# Patient Record
Sex: FEMALE | Race: WHITE | NOT HISPANIC OR LATINO | URBAN - METROPOLITAN AREA
[De-identification: names, ages, dates, MRNs, and addresses within clinical notes are randomized per-mention and may not be internally consistent; named-entity substitution may affect disease eponyms.]

---

## 2022-08-01 ENCOUNTER — OFFICE VISIT (OUTPATIENT)
Dept: OBSTETRICS AND GYNECOLOGY | Facility: CLINIC | Age: 37
End: 2022-08-01

## 2022-08-01 VITALS
BODY MASS INDEX: 25.92 KG/M2 | SYSTOLIC BLOOD PRESSURE: 124 MMHG | WEIGHT: 151.8 LBS | HEIGHT: 64 IN | DIASTOLIC BLOOD PRESSURE: 82 MMHG

## 2022-08-01 DIAGNOSIS — Z01.419 WOMEN'S ANNUAL ROUTINE GYNECOLOGICAL EXAMINATION: ICD-10-CM

## 2022-08-01 DIAGNOSIS — Z01.419 PAP TEST, AS PART OF ROUTINE GYNECOLOGICAL EXAMINATION: Primary | ICD-10-CM

## 2022-08-01 PROCEDURE — 99385 PREV VISIT NEW AGE 18-39: CPT | Performed by: NURSE PRACTITIONER

## 2022-08-01 PROCEDURE — 3008F BODY MASS INDEX DOCD: CPT | Performed by: NURSE PRACTITIONER

## 2022-08-01 PROCEDURE — 2014F MENTAL STATUS ASSESS: CPT | Performed by: NURSE PRACTITIONER

## 2022-08-01 NOTE — PROGRESS NOTES
GYN Annual Exam     CC - Here for annual exam.        HPI  Reg Abarca is a 37 y.o. female, , who presents for annual well woman exam. Patient's last menstrual period was 2022..  Periods are regular every 25-35 days, lasting 5 days. .  Dysmenorrhea:none.  Patient reports problems with: none.  There were no changes to her medical or surgical history since her last visit.. Partner Status:  Engaged. She is sexually active. She has not had new partners.. STD testing recommendations have been explained to the patient and she does not desire STD testing.    Patient denies any other issues or concerns at today's office visit.     Additional OB/GYN History   Current contraception: contraceptive methods: None  Desires to: do not start contraception  Last Pap : approximately 3 years ago-negative.  Last Completed Pap Smear     This patient has no relevant Health Maintenance data.        History of abnormal Pap smear: no  Family history of uterine, colon, breast, or ovarian cancer: no  Performs monthly Self-Breast Exam: no  Exercises Regularly:yes  Feelings of Anxiety or Depression: no  Tobacco Usage?: No   OB History        2    Para   2    Term   2            AB        Living   2       SAB        IAB        Ectopic        Molar        Multiple        Live Births   2                Health Maintenance   Topic Date Due   • COVID-19 Vaccine (1) Never done   • ANNUAL PHYSICAL  Never done   • TDAP/TD VACCINES (1 - Tdap) Never done   • HEPATITIS C SCREENING  Never done   • PAP SMEAR  Never done   • INFLUENZA VACCINE  10/01/2022   • Annual Gynecologic Pelvic and Breast Exam  2023   • Pneumococcal Vaccine 0-64  Aged Out       The additional following portions of the patient's history were reviewed and updated as appropriate: allergies, current medications, past family history, past medical history, past social history and past surgical history.    Review of Systems   Constitutional: Negative.   "  Respiratory: Negative.    Cardiovascular: Negative.    Gastrointestinal: Negative.    Genitourinary: Negative.    Neurological: Negative.    Psychiatric/Behavioral: Negative.          I have reviewed and agree with the HPI, ROS, and historical information as entered above. Magdalena Llanos, APRN    Objective   /82   Ht 162.6 cm (64\")   Wt 68.9 kg (151 lb 12.8 oz)   LMP 08/01/2022   Breastfeeding No   BMI 26.06 kg/m²     Physical Exam  Vitals and nursing note reviewed. Exam conducted with a chaperone present.   Constitutional:       Appearance: She is well-developed.   HENT:      Head: Normocephalic and atraumatic.   Neck:      Thyroid: No thyroid mass or thyromegaly.   Cardiovascular:      Heart sounds: No murmur heard.  Pulmonary:      Effort: Pulmonary effort is normal. No retractions.      Breath sounds: No wheezing, rhonchi or rales.   Chest:      Chest wall: No mass or tenderness.   Breasts:      Right: Normal. No mass, nipple discharge, skin change or tenderness.      Left: Normal. No mass, nipple discharge, skin change or tenderness.       Abdominal:      Palpations: Abdomen is soft. Abdomen is not rigid. There is no mass.      Tenderness: There is no abdominal tenderness. There is no guarding.      Hernia: No hernia is present. There is no hernia in the left inguinal area.   Genitourinary:     Labia:         Right: No rash, tenderness or lesion.         Left: No rash, tenderness or lesion.       Vagina: Normal. No vaginal discharge or lesions.      Cervix: No cervical motion tenderness, discharge, lesion or cervical bleeding.      Uterus: Normal. Not enlarged, not fixed and not tender.       Adnexa:         Right: No mass or tenderness.          Left: No mass or tenderness.        Rectum: No external hemorrhoid.   Musculoskeletal:      Cervical back: Normal range of motion. No muscular tenderness.   Neurological:      Mental Status: She is alert and oriented to person, place, and time. "   Psychiatric:         Behavior: Behavior normal.            Assessment and Plan    Problem List Items Addressed This Visit    None     Visit Diagnoses     Pap test, as part of routine gynecological examination    -  Primary    Relevant Orders    LIQUID-BASED PAP SMEAR, [facility] ([facility])    Women's annual routine gynecological examination            She is getting  soon and is not preventing pregnancy. Taking a PNV. Discussed after 6 months if she has not conceived and strongly desires she can F/U for fertility workup.    1. GYN annual well woman exam.   2. Preconceptual Counseling.  Discussed timed intercourse, regular cycles, prenatal vitamins, and when to call.  3. Reviewed monthly self breast exams.  Instructed to call with lumps, pain, or breast discharge.    4. RTC in 1 year or PRN with problems      Magdalena Llanos, APRN  08/01/2022

## 2022-08-02 ENCOUNTER — PATIENT ROUNDING (BHMG ONLY) (OUTPATIENT)
Dept: OBSTETRICS AND GYNECOLOGY | Facility: CLINIC | Age: 37
End: 2022-08-02

## 2022-08-02 NOTE — PROGRESS NOTES
August 2, 2022    Hello, may I speak with Regfartun Abarca?    My name is Jyotsna    I am  with [facility]   [facility]  [address]  [facility] [address] [phone].    Before we get started may I verify your date of birth? 1985    I am calling to officially welcome you to our practice and ask about your recent visit. Is this a good time to talk? yes    Tell me about your visit with us. What things went well?  Everything was fine       We're always looking for ways to make our patients' experiences even better. Do you have recommendations on ways we may improve?  no    Overall were you satisfied with your first visit to our practice? yes       I appreciate you taking the time to speak with me today. Is there anything else I can do for you? no      Thank you, and have a great day.

## 2022-08-03 LAB — REF LAB TEST METHOD: NORMAL

## 2022-09-22 ENCOUNTER — OFFICE VISIT (OUTPATIENT)
Dept: OBSTETRICS AND GYNECOLOGY | Facility: CLINIC | Age: 37
End: 2022-09-22

## 2022-09-22 VITALS
WEIGHT: 153.6 LBS | BODY MASS INDEX: 26.22 KG/M2 | HEIGHT: 64 IN | DIASTOLIC BLOOD PRESSURE: 78 MMHG | SYSTOLIC BLOOD PRESSURE: 118 MMHG

## 2022-09-22 DIAGNOSIS — B97.7 HPV (HUMAN PAPILLOMA VIRUS) INFECTION: ICD-10-CM

## 2022-09-22 DIAGNOSIS — R87.612 LGSIL ON PAP SMEAR OF CERVIX: Primary | ICD-10-CM

## 2022-09-22 DIAGNOSIS — Z76.89 ENCOUNTER FOR BIOPSY: ICD-10-CM

## 2022-09-22 DIAGNOSIS — Z32.01 POSITIVE PREGNANCY TEST: ICD-10-CM

## 2022-09-22 DIAGNOSIS — Z32.00 UNCONFIRMED PREGNANCY: ICD-10-CM

## 2022-09-22 LAB
B-HCG UR QL: POSITIVE
EXPIRATION DATE: ABNORMAL
INTERNAL NEGATIVE CONTROL: NEGATIVE
INTERNAL POSITIVE CONTROL: POSITIVE
Lab: ABNORMAL

## 2022-09-22 PROCEDURE — 81025 URINE PREGNANCY TEST: CPT | Performed by: OBSTETRICS & GYNECOLOGY

## 2022-09-22 PROCEDURE — 57455 BIOPSY OF CERVIX W/SCOPE: CPT | Performed by: OBSTETRICS & GYNECOLOGY

## 2022-09-22 NOTE — PROGRESS NOTES
Colposcopy Procedure Note        Procedures    Indications: Reg Abarca is a 37 y.o. female, , whose Patient's last menstrual period was 2022 (exact date)..  She presents for follow up for evaluation of an abnormal PAP smear that showed low-grade squamous intraepithelial neoplasia (LGSIL - encompassing HPV,mild dysplasia,LANE I) HPV 16+, HPV 18-. Prior cervical treatment includes: no treatment. She understands the need for the procedure and is aware of the complications, including post-colposcopic vaginal bleeding, vaginal leukorrhea or cervicitis.  She is aware she may experience discomfort.  After being presented with the risk, benefits, and alternatives the patient wished to proceed.      Urine pregnancy test done in the office today was positive x 2.      The patient has not had Gardasil.  She is not a smoker.      Procedure Details   The risks and benefits of the procedure and Verbal informed consent obtained.    She was positioned in the dorsal lithotomy position and a speculum was inserted into the vagina and excellent visualization of cervix achieved, cervix swabbed x 3 with acetic acid solution. The transformation zone was not completely visualized.  A cervical biopsy was obtained at 9 o'clock.  An endocervical curettage was not performed.  This colposcopy was unsatisfactory.     Findings:  The procedure was notable for:  Physical Exam  Vitals and nursing note reviewed. Exam conducted with a chaperone present.   Constitutional:       Appearance: Normal appearance. She is well-developed.   HENT:      Head: Normocephalic and atraumatic.   Pulmonary:      Effort: Pulmonary effort is normal.   Genitourinary:     Exam position: Lithotomy position.            Comments: Cervix: acetowhite lesion(s) noted at 9 o'clock; there is a Nabothian cyst with overlying vasculature  Skin:     General: Skin is warm and dry.   Neurological:      Mental Status: She is alert and oriented to person, place, and  time.   Psychiatric:         Mood and Affect: Mood normal.         Behavior: Behavior normal.         Specimens: 9:00  Specimens labelled and sent to Pathology.    Complications: none.    The patient tolerated the procedure very well and with mild discomfort and bleeding.    Assessment and Plan    Problem List Items Addressed This Visit    None     Visit Diagnoses     LGSIL on Pap smear of cervix    -  Primary    Relevant Orders    TISSUE EXAM, [facility] ([facility])    HPV (human papilloma virus) infection        Encounter for biopsy        Relevant Orders    POC Pregnancy, Urine (Completed)    Positive pregnancy test        Unconfirmed pregnancy        Relevant Orders    US Ob Transvaginal          1. Will base further treatment on Pathology findings.  2. Treatment options discussed with patient.  3. Post biopsy instructions given to patient.  4. Will contact pt with path and plan  5. Pt has not had Gardasil, we discussed beginning this after she has delivered.  Nonsmoker  6. UPT positive x 2 today, by LMP pt is 4w1d.  She currently takes OTC PNVs and I have instructed her to continue this.  She will RTC for new OB appt and u/s in 4 weeks.     Misty Carbajal MD  09/22/2022

## 2022-09-26 LAB — REF LAB TEST METHOD: NORMAL

## 2022-09-27 NOTE — PROGRESS NOTES
Please call patient about results.   Pt with Colpo showing LANE 3 (precancerous lesion).  Typically this would be treated by LEEP, but we don't do this in pregnancy.  The plan is to repeat pap, hpv testing and colpo in 12 weeks and follow this during pregnancy to ensure is doesn't progress.  If still present after pregnancy, will proceed with LEEP procedure.    She is seeing Dr. Rodriguez for pregnancy and I discussed it with her, so she is aware of the plan moving forward.      No smoking if she does

## 2022-10-17 ENCOUNTER — INITIAL PRENATAL (OUTPATIENT)
Dept: OBSTETRICS AND GYNECOLOGY | Facility: CLINIC | Age: 37
End: 2022-10-17

## 2022-10-17 VITALS — WEIGHT: 157.6 LBS | SYSTOLIC BLOOD PRESSURE: 122 MMHG | BODY MASS INDEX: 27.05 KG/M2 | DIASTOLIC BLOOD PRESSURE: 78 MMHG

## 2022-10-17 DIAGNOSIS — Z34.81 PRENATAL CARE, SUBSEQUENT PREGNANCY IN FIRST TRIMESTER: ICD-10-CM

## 2022-10-17 DIAGNOSIS — Z98.891 PREVIOUS CESAREAN SECTION: ICD-10-CM

## 2022-10-17 DIAGNOSIS — Z23 FLU VACCINE NEED: Primary | ICD-10-CM

## 2022-10-17 PROCEDURE — 99214 OFFICE O/P EST MOD 30 MIN: CPT | Performed by: OBSTETRICS & GYNECOLOGY

## 2022-10-17 PROCEDURE — 90471 IMMUNIZATION ADMIN: CPT | Performed by: OBSTETRICS & GYNECOLOGY

## 2022-10-17 PROCEDURE — 90686 IIV4 VACC NO PRSV 0.5 ML IM: CPT | Performed by: OBSTETRICS & GYNECOLOGY

## 2022-10-17 RX ORDER — CETIRIZINE HYDROCHLORIDE 10 MG/1
10 TABLET ORAL DAILY
COMMUNITY

## 2022-10-17 RX ORDER — PRENATAL VIT NO.126/IRON/FOLIC 28MG-0.8MG
TABLET ORAL DAILY
COMMUNITY

## 2022-10-19 LAB
ABO GROUP BLD: ABNORMAL
AMPHETAMINES UR QL SCN: NEGATIVE NG/ML
BACTERIA UR CULT: NO GROWTH
BACTERIA UR CULT: NORMAL
BARBITURATES UR QL SCN: NEGATIVE NG/ML
BASOPHILS # BLD AUTO: 0.1 X10E3/UL (ref 0–0.2)
BASOPHILS NFR BLD AUTO: 1 %
BENZODIAZ UR QL SCN: NEGATIVE NG/ML
BLD GP AB SCN SERPL QL: NEGATIVE
BZE UR QL SCN: NEGATIVE NG/ML
C TRACH RRNA SPEC QL NAA+PROBE: NEGATIVE
CANNABINOIDS UR QL SCN: NEGATIVE NG/ML
CREAT UR-MCNC: 26.3 MG/DL (ref 20–300)
EOSINOPHIL # BLD AUTO: 0 X10E3/UL (ref 0–0.4)
EOSINOPHIL NFR BLD AUTO: 0 %
ERYTHROCYTE [DISTWIDTH] IN BLOOD BY AUTOMATED COUNT: 11.9 % (ref 11.7–15.4)
HBV SURFACE AG SERPL QL IA: NEGATIVE
HCT VFR BLD AUTO: 43.2 % (ref 34–46.6)
HCV AB S/CO SERPL IA: <0.1 S/CO RATIO (ref 0–0.9)
HGB BLD-MCNC: 14.7 G/DL (ref 11.1–15.9)
HIV 1+2 AB+HIV1 P24 AG SERPL QL IA: NON REACTIVE
IMM GRANULOCYTES # BLD AUTO: 0 X10E3/UL (ref 0–0.1)
IMM GRANULOCYTES NFR BLD AUTO: 0 %
LABORATORY COMMENT REPORT: NORMAL
LYMPHOCYTES # BLD AUTO: 2.1 X10E3/UL (ref 0.7–3.1)
LYMPHOCYTES NFR BLD AUTO: 23 %
MCH RBC QN AUTO: 33.8 PG (ref 26.6–33)
MCHC RBC AUTO-ENTMCNC: 34 G/DL (ref 31.5–35.7)
MCV RBC AUTO: 99 FL (ref 79–97)
METHADONE UR QL SCN: NEGATIVE NG/ML
MONOCYTES # BLD AUTO: 0.6 X10E3/UL (ref 0.1–0.9)
MONOCYTES NFR BLD AUTO: 6 %
N GONORRHOEA RRNA SPEC QL NAA+PROBE: NEGATIVE
NEUTROPHILS # BLD AUTO: 6.5 X10E3/UL (ref 1.4–7)
NEUTROPHILS NFR BLD AUTO: 70 %
OPIATES UR QL SCN: NEGATIVE NG/ML
OXYCODONE+OXYMORPHONE UR QL SCN: NEGATIVE NG/ML
PCP UR QL: NEGATIVE NG/ML
PH UR: 6.3 [PH] (ref 4.5–8.9)
PLATELET # BLD AUTO: 224 X10E3/UL (ref 150–450)
PROPOXYPH UR QL SCN: NEGATIVE NG/ML
RBC # BLD AUTO: 4.35 X10E6/UL (ref 3.77–5.28)
RH BLD: POSITIVE
RPR SER QL: NON REACTIVE
RUBV IGG SERPL IA-ACNC: <0.9 INDEX
WBC # BLD AUTO: 9.3 X10E3/UL (ref 3.4–10.8)

## 2022-11-15 ENCOUNTER — ROUTINE PRENATAL (OUTPATIENT)
Dept: OBSTETRICS AND GYNECOLOGY | Facility: CLINIC | Age: 37
End: 2022-11-15

## 2022-11-15 VITALS — DIASTOLIC BLOOD PRESSURE: 82 MMHG | WEIGHT: 159.6 LBS | BODY MASS INDEX: 27.4 KG/M2 | SYSTOLIC BLOOD PRESSURE: 124 MMHG

## 2022-11-15 DIAGNOSIS — Z34.01 ENCOUNTER FOR SUPERVISION OF NORMAL FIRST PREGNANCY IN FIRST TRIMESTER: Primary | ICD-10-CM

## 2022-11-15 LAB
GLUCOSE UR STRIP-MCNC: NEGATIVE MG/DL
PROT UR STRIP-MCNC: NEGATIVE MG/DL

## 2022-11-15 PROCEDURE — 99213 OFFICE O/P EST LOW 20 MIN: CPT

## 2022-11-15 NOTE — PROGRESS NOTES
OB FOLLOW UP  CC- Here for care of pregnancy        Reg Abarca is a 37 y.o.  12w1d patient being seen today for her obstetrical follow up visit. Patient reports no complaints.    Her prenatal care is complicated by (and status) : None  Patient Active Problem List   Diagnosis   • Previous  section   • Prenatal care, subsequent pregnancy in first trimester       Desires genetic testing?: Yes with Gender  Flu Status: Already given in current flu season  Ultrasound Today: No    ROS -   Patient Reports : No Problems  Patient Denies: Loss of Fluid, Vaginal Spotting, Vision Changes, Headaches, Nausea  and Vomiting   Fetal Movement : Absent  All other systems reviewed and are negative.     The additional following portions of the patient's history were reviewed and updated as appropriate: allergies, current medications, past family history, past medical history, past social history, past surgical history and problem list.    I have reviewed and agree with the HPI, ROS, and historical information as entered above. Susan Nayak, APRN    /82   Wt 72.4 kg (159 lb 9.6 oz)   LMP 2022 (Exact Date)   BMI 27.40 kg/m²         EXAM:     Prenatal Vitals  BP: 124/82  Weight: 72.4 kg (159 lb 9.6 oz)   Fetal Heart Rate: 112          Urine Glucose Read-only: Negative  Urine Protein Read-only: Negative       Assessment and Plan    Problem List Items Addressed This Visit    None  Visit Diagnoses     Encounter for supervision of normal first pregnancy in first trimester    -  Primary    Relevant Orders    [id number] PLUS Core+SCA+ESS - Blood,          1. Pregnancy at 12w1d  2. Labs reviewed from New OB Visit.  3. Counseled on genetic testing, carrier status and option for NT screen  4. Activity and Exercise discussed.  5. Lab(s) Ordered- Mat21  6. Patient is on Prenatal vitamins  Return in about 4 weeks (around 2022) for AUTUMN w/ HELENA.    Susan Nayak, JOVANI  11/15/2022

## 2022-12-14 ENCOUNTER — ROUTINE PRENATAL (OUTPATIENT)
Dept: OBSTETRICS AND GYNECOLOGY | Facility: CLINIC | Age: 37
End: 2022-12-14
Payer: COMMERCIAL

## 2022-12-14 VITALS — BODY MASS INDEX: 27.77 KG/M2 | SYSTOLIC BLOOD PRESSURE: 118 MMHG | DIASTOLIC BLOOD PRESSURE: 86 MMHG | WEIGHT: 161.8 LBS

## 2022-12-14 DIAGNOSIS — Z98.891 PREVIOUS CESAREAN SECTION: ICD-10-CM

## 2022-12-14 DIAGNOSIS — O09.529 ANTEPARTUM MULTIGRAVIDA OF ADVANCED MATERNAL AGE: ICD-10-CM

## 2022-12-14 DIAGNOSIS — Z34.82 MULTIGRAVIDA IN SECOND TRIMESTER: Primary | ICD-10-CM

## 2022-12-14 LAB
GLUCOSE UR STRIP-MCNC: NEGATIVE MG/DL
PROT UR STRIP-MCNC: NEGATIVE MG/DL

## 2022-12-14 PROCEDURE — 99213 OFFICE O/P EST LOW 20 MIN: CPT | Performed by: OBSTETRICS & GYNECOLOGY

## 2022-12-14 NOTE — PROGRESS NOTES
OB FOLLOW UP  CC- Here for care of pregnancy        Reg Abarca is a 37 y.o.  16w2d patient being seen today for her obstetrical follow up visit. Patient reports nausea in the evenings.     Her prenatal care is complicated by (and status) : AMA  Patient Active Problem List   Diagnosis   • Previous  section   • Prenatal care, subsequent pregnancy in first trimester   • Multigravida in second trimester   • Antepartum multigravida of advanced maternal age       Flu Status: Already given in current flu season  Ultrasound Today: No    AFP: desires    ROS -   Patient Reports : Patient reports nausea in the evenings.  Patient Denies: Loss of Fluid, Vaginal Spotting, Vision Changes, Headaches, Vomiting , Contractions and Epigastric pain  Fetal Movement : absent-too early  All other systems reviewed and are negative.       The additional following portions of the patient's history were reviewed and updated as appropriate: allergies, current medications, past family history, past medical history, past social history, past surgical history and problem list.    I have reviewed and agree with the HPI, ROS, and historical information as entered above. Lisandra Rodriguez MD        EXAM:     Prenatal Vitals  BP: 118/86  Weight: 73.4 kg (161 lb 12.8 oz)   Fetal Heart Rate: pos   Pelvic Exam:        Urine Glucose Read-only: Negative  Urine Protein Read-only: Negative           Assessment and Plan    Problem List Items Addressed This Visit     Previous  section    Overview     C/s x 2         Multigravida in second trimester - Primary    Relevant Orders    Alpha Fetoprotein, Maternal (Completed)    POC Urinalysis Dipstick (Completed)    US Ob 14 + Weeks Single or First Gestation    Antepartum multigravida of advanced maternal age       1. Pregnancy at 16w2d  2. Fetal status reassuring.   3. Counseled on MSAFP alone in relation to OTD and placental issues.    4. Anatomy scan next visit.   5. Activity and  Exercise discussed.  6. U/S ordered at follow up  7. Patient is on Prenatal vitamins  Return in about 4 weeks (around 1/11/2023) for WITH SONO.    Lisandra Rodriguez MD  12/14/2022

## 2022-12-16 LAB
AFP INTERP SERPL-IMP: NORMAL
AFP INTERP SERPL-IMP: NORMAL
AFP MOM SERPL: 1
AFP SERPL-MCNC: 34.9 NG/ML
AGE AT DELIVERY: 37.8 YR
GA METHOD: NORMAL
GA: 16.3 WEEKS
IDDM PATIENT QL: NO
LABORATORY COMMENT REPORT: NORMAL
MULTIPLE PREGNANCY: NO
NEURAL TUBE DEFECT RISK FETUS: NORMAL %
RESULT: NORMAL

## 2023-01-05 PROBLEM — O09.529 ANTEPARTUM MULTIGRAVIDA OF ADVANCED MATERNAL AGE: Status: ACTIVE | Noted: 2023-01-05

## 2023-01-17 ENCOUNTER — ROUTINE PRENATAL (OUTPATIENT)
Dept: OBSTETRICS AND GYNECOLOGY | Facility: CLINIC | Age: 38
End: 2023-01-17
Payer: COMMERCIAL

## 2023-01-17 VITALS — DIASTOLIC BLOOD PRESSURE: 62 MMHG | SYSTOLIC BLOOD PRESSURE: 116 MMHG | BODY MASS INDEX: 27.98 KG/M2 | WEIGHT: 163 LBS

## 2023-01-17 DIAGNOSIS — Z34.82 MULTIGRAVIDA IN SECOND TRIMESTER: ICD-10-CM

## 2023-01-17 DIAGNOSIS — Z98.891 PREVIOUS CESAREAN SECTION: ICD-10-CM

## 2023-01-17 DIAGNOSIS — O09.529 ANTEPARTUM MULTIGRAVIDA OF ADVANCED MATERNAL AGE: ICD-10-CM

## 2023-01-17 DIAGNOSIS — Z34.82 PRENATAL CARE, SUBSEQUENT PREGNANCY, SECOND TRIMESTER: Primary | ICD-10-CM

## 2023-01-17 LAB
GLUCOSE UR STRIP-MCNC: NEGATIVE MG/DL
PROT UR STRIP-MCNC: NEGATIVE MG/DL

## 2023-01-17 PROCEDURE — 99213 OFFICE O/P EST LOW 20 MIN: CPT | Performed by: OBSTETRICS & GYNECOLOGY

## 2023-01-17 RX ORDER — LANOLIN ALCOHOL/MO/W.PET/CERES
25 CREAM (GRAM) TOPICAL DAILY
COMMUNITY

## 2023-02-14 ENCOUNTER — ROUTINE PRENATAL (OUTPATIENT)
Dept: OBSTETRICS AND GYNECOLOGY | Facility: CLINIC | Age: 38
End: 2023-02-14
Payer: COMMERCIAL

## 2023-02-14 VITALS — WEIGHT: 166.4 LBS | SYSTOLIC BLOOD PRESSURE: 112 MMHG | DIASTOLIC BLOOD PRESSURE: 72 MMHG | BODY MASS INDEX: 28.56 KG/M2

## 2023-02-14 DIAGNOSIS — O09.529 ANTEPARTUM MULTIGRAVIDA OF ADVANCED MATERNAL AGE: ICD-10-CM

## 2023-02-14 DIAGNOSIS — O43.109: ICD-10-CM

## 2023-02-14 DIAGNOSIS — R87.613 HSIL ON PAP SMEAR OF CERVIX: ICD-10-CM

## 2023-02-14 DIAGNOSIS — Z34.82 MULTIGRAVIDA IN SECOND TRIMESTER: Primary | ICD-10-CM

## 2023-02-14 DIAGNOSIS — Z98.891 PREVIOUS CESAREAN SECTION: ICD-10-CM

## 2023-02-14 LAB
GLUCOSE UR STRIP-MCNC: NEGATIVE MG/DL
PROT UR STRIP-MCNC: NEGATIVE MG/DL

## 2023-02-14 PROCEDURE — 99213 OFFICE O/P EST LOW 20 MIN: CPT | Performed by: OBSTETRICS & GYNECOLOGY

## 2023-02-14 NOTE — PROGRESS NOTES
OB FOLLOW UP  CC- Here for care of pregnancy        Reg Martinez is a 37 y.o.  25w1d patient being seen today for her obstetrical follow up visit. Patient reports no complaints.    Her prenatal care is complicated by (and status) :   Patient Active Problem List   Diagnosis   • Previous  section   • Prenatal care, subsequent pregnancy in first trimester   • Multigravida in second trimester   • Antepartum multigravida of advanced maternal age   • HSIL on Pap smear of cervix   • Abnormality of placenta during pregnancy       Flu Status: Already given in current flu season  Ultrasound Today: No    ROS -   Patient Reports : No Problems  Patient Denies: Loss of Fluid, Vaginal Spotting, Vision Changes, Headaches, Nausea , Vomiting , Contractions and Epigastric pain  Fetal Movement : normal  All other systems reviewed and are negative.       The additional following portions of the patient's history were reviewed and updated as appropriate: allergies, current medications, past family history, past medical history, past social history, past surgical history and problem list.    I have reviewed and agree with the HPI, ROS, and historical information as entered above. Lisandra Rodriguez MD    /72   Wt 75.5 kg (166 lb 6.4 oz)   LMP 2022 (Exact Date)   BMI 28.56 kg/m²       EXAM:     Prenatal Vitals  BP: 112/72  Weight: 75.5 kg (166 lb 6.4 oz)   Fetal Heart Rate: 155      Fundal Height (cm): 25 cm        Urine Glucose Read-only: Negative  Urine Protein Read-only: Negative       Assessment and Plan    Problem List Items Addressed This Visit     Previous  section    Overview     C/s x 2         Multigravida in second trimester - Primary    Relevant Orders    POC Urinalysis Dipstick (Completed)    Antepartum multigravida of advanced maternal age    HSIL on Pap smear of cervix    Overview     Was first abnl pap, plan was to repeat in 12wk.  Done  pap with HPV         Abnormality of  placenta during pregnancy       1. Pregnancy at 25w1d  2. Fetal status reassuring.  3. No US indicated today.  4. 1 hour gtt, CBC, Antibody screen and TDAP next visit. Instructions given  5. Lab(s) Ordered  6. Discussed/encouraged TDAP vaccination after 28 weeks  7. Activity and Exercise discussed.  Return in about 3 weeks (around 3/7/2023) for WITH SONO, 28wk labs.    Lisandra Rodriguez MD  02/14/2023

## 2023-02-15 LAB — REF LAB TEST METHOD: NORMAL

## 2023-03-02 DIAGNOSIS — O43.109: Primary | ICD-10-CM

## 2023-03-08 ENCOUNTER — ROUTINE PRENATAL (OUTPATIENT)
Dept: OBSTETRICS AND GYNECOLOGY | Facility: CLINIC | Age: 38
End: 2023-03-08
Payer: COMMERCIAL

## 2023-03-08 VITALS — BODY MASS INDEX: 29.18 KG/M2 | DIASTOLIC BLOOD PRESSURE: 80 MMHG | SYSTOLIC BLOOD PRESSURE: 124 MMHG | WEIGHT: 170 LBS

## 2023-03-08 DIAGNOSIS — Z98.891 PREVIOUS CESAREAN SECTION: ICD-10-CM

## 2023-03-08 DIAGNOSIS — O43.109: ICD-10-CM

## 2023-03-08 DIAGNOSIS — R87.613 HSIL ON PAP SMEAR OF CERVIX: ICD-10-CM

## 2023-03-08 DIAGNOSIS — Z34.93 PRENATAL CARE IN THIRD TRIMESTER: Primary | ICD-10-CM

## 2023-03-08 DIAGNOSIS — O09.529 ANTEPARTUM MULTIGRAVIDA OF ADVANCED MATERNAL AGE: ICD-10-CM

## 2023-03-08 LAB
GLUCOSE UR STRIP-MCNC: NEGATIVE MG/DL
PROT UR STRIP-MCNC: NEGATIVE MG/DL

## 2023-03-08 PROCEDURE — 0502F SUBSEQUENT PRENATAL CARE: CPT | Performed by: OBSTETRICS & GYNECOLOGY

## 2023-03-08 PROCEDURE — 90715 TDAP VACCINE 7 YRS/> IM: CPT | Performed by: OBSTETRICS & GYNECOLOGY

## 2023-03-08 PROCEDURE — 90471 IMMUNIZATION ADMIN: CPT | Performed by: OBSTETRICS & GYNECOLOGY

## 2023-03-08 NOTE — PROGRESS NOTES
OB FOLLOW UP  CC- Here for care of pregnancy        Reg Martinez is a 37 y.o.  28w2d patient being seen today for her obstetrical follow up. Patient reports headache. That is relieved by Tylenol or rest. .     Patient undergoing Glucola testing today. She is due for her testing at 1150.       MBT: O+  Rhogam: N/A  28 week packet: reviewed with patient , counseled on fetal movement , pediatrician list reviewed, breast pump discussed and childbirth classes reviewed  TDAP: given today    Ultrasound Today: Yes    Her prenatal care is complicated by (and status) :    Patient Active Problem List   Diagnosis   • Previous  section   • Prenatal care, subsequent pregnancy in first trimester   • Multigravida in second trimester   • Antepartum multigravida of advanced maternal age   • HSIL on Pap smear of cervix   • Abnormality of placenta during pregnancy         ROS -   Patient Reports : Headaches  Patient Denies: Loss of Fluid, Vaginal Spotting, Vision Changes and Contractions  Fetal Movement : normal    The additional following portions of the patient's history were reviewed and updated as appropriate: allergies, current medications, past family history, past medical history, past social history, past surgical history and problem list.    I have reviewed and agree with the HPI, ROS, and historical information as entered above. Lisandra Rodriguez MD    /80   Wt 77.1 kg (170 lb)   LMP 2022 (Exact Date)   BMI 29.18 kg/m²         EXAM:     Prenatal Vitals  BP: 124/80  Weight: 77.1 kg (170 lb)   Fetal Heart Rate: 145bpm               Urine Glucose Read-only: Negative  Urine Protein Read-only: Negative       Assessment and Plan    Problem List Items Addressed This Visit     Previous  section    Overview     C/s x 2         Antepartum multigravida of advanced maternal age    HSIL on Pap smear of cervix    Overview     Was first abnl pap, plan was to repeat in 12wk.  Done  normal  pap with HPV neg         Abnormality of placenta during pregnancy    Relevant Orders    US Ob Follow Up Transabdominal Approach   Other Visit Diagnoses     Prenatal care in third trimester    -  Primary    Relevant Orders    Gestational Screen 1 Hr ([facility]) (Completed)    CBC (No Diff) (Completed)    Antibody Screen (Completed)    POC Urinalysis Dipstick (Completed)          1. Pregnancy at 28w2d  2. 1 hr Glucola, CBC, and antibody screen today  and TDAP given today  3. Fetal movement/PTL or Labor precautions  4. Lab(s) Ordered  5. Activity and Exercise discussed.  Return in about 4 weeks (around 4/5/2023) for Next scheduled follow up.    Lsiandra Rodriguez MD  03/08/2023

## 2023-03-09 LAB
BLD GP AB SCN SERPL QL: NEGATIVE
ERYTHROCYTE [DISTWIDTH] IN BLOOD BY AUTOMATED COUNT: 11.8 % (ref 12.3–15.4)
GLUCOSE 1H P 50 G GLC PO SERPL-MCNC: 143 MG/DL (ref 65–139)
HCT VFR BLD AUTO: 38.4 % (ref 34–46.6)
HGB BLD-MCNC: 13.4 G/DL (ref 12–15.9)
MCH RBC QN AUTO: 32.8 PG (ref 26.6–33)
MCHC RBC AUTO-ENTMCNC: 34.9 G/DL (ref 31.5–35.7)
MCV RBC AUTO: 93.9 FL (ref 79–97)
PLATELET # BLD AUTO: 181 10*3/MM3 (ref 140–450)
RBC # BLD AUTO: 4.09 10*6/MM3 (ref 3.77–5.28)
WBC # BLD AUTO: 6.47 10*3/MM3 (ref 3.4–10.8)

## 2023-03-16 ENCOUNTER — LAB (OUTPATIENT)
Dept: OBSTETRICS AND GYNECOLOGY | Facility: CLINIC | Age: 38
End: 2023-03-16
Payer: COMMERCIAL

## 2023-03-16 DIAGNOSIS — R73.09 ELEVATED GLUCOSE TOLERANCE TEST: Primary | ICD-10-CM

## 2023-03-17 LAB
GLUCOSE 1H P 100 G GLC PO SERPL-MCNC: 157 MG/DL (ref 65–179)
GLUCOSE 2H P 100 G GLC PO SERPL-MCNC: 147 MG/DL (ref 65–154)
GLUCOSE 3H P 100 G GLC PO SERPL-MCNC: 123 MG/DL (ref 65–139)
GLUCOSE P FAST SERPL-MCNC: 74 MG/DL (ref 65–94)

## 2023-04-03 ENCOUNTER — ROUTINE PRENATAL (OUTPATIENT)
Dept: OBSTETRICS AND GYNECOLOGY | Facility: CLINIC | Age: 38
End: 2023-04-03
Payer: COMMERCIAL

## 2023-04-03 VITALS — DIASTOLIC BLOOD PRESSURE: 64 MMHG | WEIGHT: 171.4 LBS | SYSTOLIC BLOOD PRESSURE: 118 MMHG | BODY MASS INDEX: 29.42 KG/M2

## 2023-04-03 DIAGNOSIS — R11.0 PREGNANCY RELATED NAUSEA, ANTEPARTUM: ICD-10-CM

## 2023-04-03 DIAGNOSIS — O43.109: ICD-10-CM

## 2023-04-03 DIAGNOSIS — Z34.83 PRENATAL CARE, SUBSEQUENT PREGNANCY, THIRD TRIMESTER: Primary | ICD-10-CM

## 2023-04-03 DIAGNOSIS — O35.EXX0 FETAL RENAL ANOMALY, SINGLE GESTATION: ICD-10-CM

## 2023-04-03 DIAGNOSIS — O26.899 PREGNANCY RELATED NAUSEA, ANTEPARTUM: ICD-10-CM

## 2023-04-03 DIAGNOSIS — R12 HEARTBURN: ICD-10-CM

## 2023-04-03 LAB
EXPIRATION DATE: 0
GLUCOSE UR STRIP-MCNC: NEGATIVE MG/DL
Lab: 0
PROT UR STRIP-MCNC: ABNORMAL MG/DL

## 2023-04-03 PROCEDURE — 0502F SUBSEQUENT PRENATAL CARE: CPT

## 2023-04-03 NOTE — ASSESSMENT & PLAN NOTE
3/8/23 Small hypoechoic area noted measuring 20 x 7 x 7 mm.  Renals appear slightly enlarged/hyperechoic; CL 4.6cm    4/3/23 Resolved

## 2023-04-03 NOTE — PROGRESS NOTES
OB FOLLOW UP  CC- Here for care of pregnancy        Reg Martinez is a 37 y.o.  32w0d patient being seen today for her obstetrical follow up visit. Patient reports heartburn, nausea, blurred vision, x1    Her prenatal care is complicated by (and status) :    Patient Active Problem List   Diagnosis   • Previous  section   • Prenatal care, subsequent pregnancy in first trimester   • Multigravida in second trimester   • Antepartum multigravida of advanced maternal age   • HSIL on Pap smear of cervix   • Abnormality of placenta during pregnancy   • Fetal renal anomaly, single gestation     ROS -   Patient Reports : see above  Patient Denies: Loss of Fluid, Vaginal Spotting, Headaches, Vomiting , Contractions and Epigastric pain  Fetal Movement : normal  All other systems reviewed and are negative.       The additional following portions of the patient's history were reviewed and updated as appropriate: allergies and current medications.    I have reviewed and agree with the HPI, ROS, and historical information as entered above. Elizabeth Fitzgerald, APRN    /64   Wt 77.7 kg (171 lb 6.4 oz)   LMP 2022 (Exact Date)   BMI 29.42 kg/m²       EXAM:     Prenatal Vitals  BP: 118/64  Weight: 77.7 kg (171 lb 6.4 oz)   Fetal Heart Rate: 148           Urine Glucose Read-only: Negative  Urine Protein Read-only: (!) Trace       Assessment and Plan    Problem List Items Addressed This Visit        Gravid and     Abnormality of placenta during pregnancy    Current Assessment & Plan     3/8/23 Small hypoechoic area noted measuring 20 x 7 x 7 mm.  Renals appear slightly enlarged/hyperechoic; CL 4.6cm    4/3/23 Resolved          Fetal renal anomaly, single gestation    Current Assessment & Plan     3/8/23 Small hypoechoic area noted measuring 20 x 7 x 7 mm.  Renals appear slightly enlarged/hyperechoic; CL 4.6cm    4/3/23 Resolved         Other Visit Diagnoses     Prenatal care, subsequent pregnancy,  third trimester    -  Primary    Relevant Orders    POC Glucose, Urine, Qualitative, Dipstick (Completed)    POC Protein, Urine, Qualitative, Dipstick (Completed)    Heartburn        Pregnancy related nausea, antepartum              1. Pregnancy at 32w0d  2. Fetal status reassuring.  3. 28 week labs reviewed.    4. Taking unisom for nausea and tums for heartburn.  5. Activity and Exercise discussed.  6. Fetal movement/PTL or Labor precautions  7. Patient is on Prenatal vitamins  8. Reviewed Pre-eclampsia signs/symptoms  9. Repeat ultrasound today for follow up placenta and fetal kidneys-resolved  10. Follow up in 2 weeks with Dr. Rodriguez.    Elizabeth Fitzgerald, APRN  04/03/2023

## 2023-04-18 ENCOUNTER — ROUTINE PRENATAL (OUTPATIENT)
Dept: OBSTETRICS AND GYNECOLOGY | Facility: CLINIC | Age: 38
End: 2023-04-18
Payer: COMMERCIAL

## 2023-04-18 VITALS — WEIGHT: 172.8 LBS | SYSTOLIC BLOOD PRESSURE: 120 MMHG | DIASTOLIC BLOOD PRESSURE: 76 MMHG | BODY MASS INDEX: 29.66 KG/M2

## 2023-04-18 DIAGNOSIS — O43.109: ICD-10-CM

## 2023-04-18 DIAGNOSIS — Z34.83 MULTIGRAVIDA IN THIRD TRIMESTER: Primary | ICD-10-CM

## 2023-04-18 DIAGNOSIS — O35.EXX0 FETAL RENAL ANOMALY, SINGLE GESTATION: ICD-10-CM

## 2023-04-18 DIAGNOSIS — Z98.891 PREVIOUS CESAREAN SECTION: ICD-10-CM

## 2023-04-18 LAB
GLUCOSE UR STRIP-MCNC: NEGATIVE MG/DL
PROT UR STRIP-MCNC: NEGATIVE MG/DL

## 2023-04-18 NOTE — PROGRESS NOTES
OB FOLLOW UP  CC- Here for care of pregnancy        Reg Martinez is a 37 y.o.  34w1d patient being seen today for her obstetrical follow up visit. Patient reports having heartburn that is resolved with TUMS and BH contractions.    Her prenatal care is complicated by (and status) : AMA  Patient Active Problem List   Diagnosis   • Previous  section   • Prenatal care, subsequent pregnancy in first trimester   • Multigravida in second trimester   • Antepartum multigravida of advanced maternal age   • HSIL on Pap smear of cervix   • Abnormality of placenta during pregnancy   • Fetal renal anomaly, single gestation   • Multigravida in third trimester          Ultrasound Today: No  Non Stress Test: No.      ROS -   Patient Reports : Patient reports having heartburn that is resolved with TUMS and BH contractions.  Patient Denies: Loss of Fluid, Vaginal Spotting, Vision Changes, Headaches, Nausea , Vomiting , Contractions and Epigastric pain  Fetal Movement : normal  All other systems reviewed and are negative.       The additional following portions of the patient's history were reviewed and updated as appropriate: allergies, current medications, past family history, past medical history, past social history, past surgical history and problem list.    I have reviewed and agree with the HPI, ROS, and historical information as entered above. Lisandra Rodriguez MD    /76   Wt 78.4 kg (172 lb 12.8 oz)   LMP 2022 (Exact Date)   BMI 29.66 kg/m²       EXAM:     Prenatal Vitals  BP: 120/76  Weight: 78.4 kg (172 lb 12.8 oz)   Fetal Heart Rate: pos               Urine Glucose Read-only: Negative  Urine Protein Read-only: Negative       Assessment and Plan    Problem List Items Addressed This Visit     Previous  section    Overview     C/s x 2  Plan vasectomy         Abnormality of placenta during pregnancy    Overview     resolved         Fetal renal anomaly, single gestation    Overview      Now normal         Multigravida in third trimester - Primary    Relevant Orders    POC Urinalysis Dipstick (Completed)       1. Pregnancy at 34w1d  2. Fetal status reassuring.   3. Activity and Exercise discussed.  4. Fetal movement/PTL or Labor precautions  5. GBS next visit  Return in about 2 weeks (around 5/2/2023) for Next scheduled follow up.    Lisandra Rodriguez MD  04/18/2023

## 2023-05-01 ENCOUNTER — ROUTINE PRENATAL (OUTPATIENT)
Dept: OBSTETRICS AND GYNECOLOGY | Facility: CLINIC | Age: 38
End: 2023-05-01
Payer: COMMERCIAL

## 2023-05-01 ENCOUNTER — LAB (OUTPATIENT)
Dept: LAB | Facility: HOSPITAL | Age: 38
End: 2023-05-01
Payer: COMMERCIAL

## 2023-05-01 VITALS — SYSTOLIC BLOOD PRESSURE: 120 MMHG | WEIGHT: 174 LBS | BODY MASS INDEX: 29.87 KG/M2 | DIASTOLIC BLOOD PRESSURE: 72 MMHG

## 2023-05-01 DIAGNOSIS — Z34.93 ENCOUNTER FOR SUPERVISION OF NORMAL PREGNANCY IN THIRD TRIMESTER, UNSPECIFIED GRAVIDITY: Primary | ICD-10-CM

## 2023-05-01 DIAGNOSIS — Z34.93 THIRD TRIMESTER PREGNANCY: Primary | ICD-10-CM

## 2023-05-01 DIAGNOSIS — Z98.891 PREVIOUS CESAREAN SECTION: ICD-10-CM

## 2023-05-01 DIAGNOSIS — O09.529 ANTEPARTUM MULTIGRAVIDA OF ADVANCED MATERNAL AGE: ICD-10-CM

## 2023-05-01 DIAGNOSIS — O35.EXX0 FETAL RENAL ANOMALY, SINGLE GESTATION: ICD-10-CM

## 2023-05-01 LAB
GLUCOSE UR STRIP-MCNC: NEGATIVE MG/DL
PROT UR STRIP-MCNC: NEGATIVE MG/DL

## 2023-05-01 PROCEDURE — 87081 CULTURE SCREEN ONLY: CPT

## 2023-05-01 NOTE — PROGRESS NOTES
OB FOLLOW UP  CC- Here for care of pregnancy        Reg Martinez is a 37 y.o.  36w0d patient being seen today for her obstetrical follow up visit. Patient reports no complaints. GBS TDY/NKDA. Pt feels well, good fm, occ ctx., no c/o    Her prenatal care is complicated by (and status) : AMA  Patient Active Problem List   Diagnosis   • Previous  section   • Prenatal care, subsequent pregnancy in first trimester   • Antepartum multigravida of advanced maternal age   • HSIL on Pap smear of cervix   • Abnormality of placenta during pregnancy   • Fetal renal anomaly, single gestation   • Multigravida in third trimester   • Pregnancy       GBS Status: Done Today. She is not allergic to PCN.    No Known Allergies       Flu Status: Already given in current flu season  Her Delivery Plan is: vaginal    US today: no  Non Stress Test: No.      ROS -   Patient Reports : No Problems  Patient Denies: Loss of Fluid, Vaginal Spotting, Vision Changes, Headaches, Nausea , Vomiting  and Epigastric pain  Fetal Movement : normal  All other systems reviewed and are negative.       The additional following portions of the patient's history were reviewed and updated as appropriate: current medications, past family history, past medical history, past social history, past surgical history and problem list.    I have reviewed and agree with the HPI, ROS, and historical information as entered above. Lisandra Rodriguez MD      EXAM:     Prenatal Vitals  BP: 120/72  Weight: 78.9 kg (174 lb)   Fetal Heart Rate: pos   Fundal Height (cm): 36 cm          Urine Glucose Read-only: Negative  Urine Protein Read-only: Negative       Assessment and Plan    Problem List Items Addressed This Visit     Previous  section    Overview     C/s x 2  Plan vasectomy         Antepartum multigravida of advanced maternal age    Fetal renal anomaly, single gestation    Overview     Now normal        Other Visit Diagnoses     Third  trimester pregnancy    -  Primary    Relevant Orders    POC Urinalysis Dipstick (Completed)          1. Pregnancy at 36w0d  2. Fetal status reassuring.   3. Reviewed Pre-eclampsia signs/symptoms  4. Delivery options reviewed with patient  5. Signs of labor reviewed  6. Kick counts reviewed  7. Activity and Exercise discussed.  Return in about 1 week (around 5/8/2023) for Next scheduled follow up.    Lisandra Rodriguez MD  05/01/2023

## 2023-05-03 LAB — BACTERIA SPEC AEROBE CULT: NORMAL

## 2023-05-09 ENCOUNTER — ROUTINE PRENATAL (OUTPATIENT)
Dept: OBSTETRICS AND GYNECOLOGY | Facility: CLINIC | Age: 38
End: 2023-05-09
Payer: COMMERCIAL

## 2023-05-09 VITALS — SYSTOLIC BLOOD PRESSURE: 126 MMHG | DIASTOLIC BLOOD PRESSURE: 82 MMHG | BODY MASS INDEX: 30.21 KG/M2 | WEIGHT: 176 LBS

## 2023-05-09 DIAGNOSIS — Z34.83 MULTIGRAVIDA IN THIRD TRIMESTER: Primary | ICD-10-CM

## 2023-05-09 DIAGNOSIS — Z98.891 PREVIOUS CESAREAN SECTION: ICD-10-CM

## 2023-05-09 DIAGNOSIS — O09.529 ANTEPARTUM MULTIGRAVIDA OF ADVANCED MATERNAL AGE: ICD-10-CM

## 2023-05-09 LAB
GLUCOSE UR STRIP-MCNC: NEGATIVE MG/DL
PROT UR STRIP-MCNC: NEGATIVE MG/DL

## 2023-05-09 NOTE — PROGRESS NOTES
OB FOLLOW UP  CC- Here for care of pregnancy        Reg Martinez is a 37 y.o.  37w1d patient being seen today for her obstetrical follow up visit. Patient reports having occasional contractions. .     Her prenatal care is complicated by (and status) :   Patient Active Problem List   Diagnosis   • Previous  section   • Prenatal care, subsequent pregnancy in first trimester   • Antepartum multigravida of advanced maternal age   • HSIL on Pap smear of cervix   • Abnormality of placenta during pregnancy   • Fetal renal anomaly, single gestation   • Multigravida in third trimester   • Pregnancy       GBS Status: Negative  Group B Strep Culture   Date Value Ref Range Status   2023 No Group B Streptococcus isolated  Final         No Known Allergies          Her Delivery Plan is: Repeat . Scheduled    US today: no  Non Stress Test: No.      ROS -   Patient Reports : Patient reports having occasional contractions.  Patient Denies: Loss of Fluid, Vaginal Spotting, Vision Changes, Headaches, Nausea , Vomiting  and Epigastric pain  Fetal Movement : normal  All other systems reviewed and are negative.       The additional following portions of the patient's history were reviewed and updated as appropriate: allergies, current medications, past family history, past medical history, past social history, past surgical history and problem list.    I have reviewed and agree with the HPI, ROS, and historical information as entered above. Lisandra Rodriguez MD      EXAM:     Prenatal Vitals  BP: 126/82  Weight: 79.8 kg (176 lb)   Fetal Heart Rate: pos   Fundal Height (cm): 37 cm      NST >20min, reactive  Indication AMA    Urine Glucose Read-only: Negative  Urine Protein Read-only: Negative       Assessment and Plan    Problem List Items Addressed This Visit     Previous  section    Overview     C/s x 2  Plan vasectomy         Antepartum multigravida of advanced maternal age     Multigravida in third trimester - Primary    Relevant Orders    POC Urinalysis Dipstick (Completed)       1. Pregnancy at 37w1d  2. Fetal status reassuring.   3. Reviewed Pre-eclampsia signs/symptoms  4. Reviewed upcoming c/s and PAT instructions with patient. Arrival time and NPO status reviewed.   5. Delivery options reviewed with patient  6. Signs of labor reviewed  7. Kick counts reviewed  8. Activity and Exercise discussed.  Return in about 1 week (around 5/16/2023) for Next scheduled follow up.    Lisandra Rodriguez MD  05/09/2023   Josy Rivers

## 2023-05-14 ENCOUNTER — HOSPITAL ENCOUNTER (OUTPATIENT)
Facility: HOSPITAL | Age: 38
Discharge: HOME | End: 2023-05-14
Attending: OBSTETRICS & GYNECOLOGY | Admitting: OBSTETRICS & GYNECOLOGY
Payer: COMMERCIAL

## 2023-05-14 VITALS
HEART RATE: 96 BPM | SYSTOLIC BLOOD PRESSURE: 132 MMHG | RESPIRATION RATE: 18 BRPM | DIASTOLIC BLOOD PRESSURE: 85 MMHG | TEMPERATURE: 98.5 F

## 2023-05-14 PROBLEM — Z34.82 MULTIGRAVIDA IN SECOND TRIMESTER: Status: RESOLVED | Noted: 2022-12-14 | Resolved: 2023-05-14

## 2023-05-14 PROBLEM — Z34.90 PREGNANCY: Status: ACTIVE | Noted: 2023-05-14

## 2023-05-14 LAB — POC AMNISURE: NEGATIVE

## 2023-05-14 PROCEDURE — G0378 HOSPITAL OBSERVATION PER HR: HCPCS

## 2023-05-14 PROCEDURE — 84112 EVAL AMNIOTIC FLUID PROTEIN: CPT | Performed by: OBSTETRICS & GYNECOLOGY

## 2023-05-14 PROCEDURE — G0463 HOSPITAL OUTPT CLINIC VISIT: HCPCS

## 2023-05-14 PROCEDURE — 59025 FETAL NON-STRESS TEST: CPT

## 2023-05-14 NOTE — H&P
[facility]  Obstetric History and Physical    Referring Provider: Lisandra Rodriguez*      Chief Complaint   Patient presents with   • Rupture of Membranes       Subjective     Patient is a 37 y.o. female  currently at 37w6d, who presents with complaint of possible rupture membranes.  Patient reports leaking of small amount of clear fluid the past 3 hours without any increased urine activity or vaginal bleeding.  Patient reports normal fetal activity.   course uncomplicated to date other than 2 prior C-sections at term.  Patient scheduled for repeat  next week.         The following portions of the patients history were reviewed and updated as appropriate: current medications, allergies, past medical history, past surgical history, past family history, past social history and problem list .       Prenatal Information:   Maternal Prenatal Labs  Blood Type No results found for: ABO   Rh Status No results found for: RH   Antibody Screen No results found for: ABSCRN   Gonnorhea No results found for: GCCX   Chlamydia No results found for: CLAMYDCU   RPR No results found for: RPR   Syphilis Antibody No results found for: SYPHILIS   Rubella No results found for: RUBELLAIGGIN   Hepatitis B Surface Antigen No results found for: HEPBSAG   HIV-1 Antibody No results found for: LABHIV1   Hepatitis C Antibody No results found for: HEPCAB   Rapid Urin Drug Screen No results found for: AMPMETHU, BARBITSCNUR, LABBENZSCN, LABMETHSCN, LABOPIASCN, THCURSCR, COCAINEUR, AMPHETSCREEN, PROPOXSCN, BUPRENORSCNU, METAMPSCNUR, OXYCODONESCN, TRICYCLICSCN   Group B Strep Culture No results found for: GBSANTIGEN           External Prenatal Results     Pregnancy Outside Results - Transcribed From Office Records - See Scanned Records For Details     Test Value Date Time    ABO  O  10/17/22 1559    Rh  Positive  10/17/22 1559    Antibody Screen  Negative  23 1158       Negative  10/17/22 1559    Varicella IgG        Rubella  <0.90 index 10/17/22 1559    Hgb  13.4 g/dL 23 1158       14.7 g/dL 10/17/22 1559    Hct  38.4 % 23 1158       43.2 % 10/17/22 1559    Glucose Fasting GTT  74 mg/dL 23 1120    Glucose Tolerance Test 1 hour  157 mg/dL 23 1120    Glucose Tolerance Test 3 hour  123 mg/dL 23 1120    Gonorrhea (discrete)  Negative  10/17/22 1559    Chlamydia (discrete)  Negative  10/17/22 1559    RPR  Non Reactive  10/17/22 1559    VDRL       Syphilis Antibody       HBsAg  Negative  10/17/22 1559    Herpes Simplex Virus PCR       Herpes Simplex VIrus Culture       HIV  Non Reactive  10/17/22 1559    Hep C RNA Quant PCR       Hep C Antibody  <0.1 s/co ratio 10/17/22 1559    AFP  34.9 ng/mL 22 1410    Group B Strep  No Group B Streptococcus isolated  23 1808    GBS Susceptibility to Clindamycin       GBS Susceptibility to Erythromycin       Fetal Fibronectin       Genetic Testing, Maternal Blood             Drug Screening     Test Value Date Time    Urine Drug Screen       Amphetamine Screen  Negative ng/mL 10/17/22 1559    Barbiturate Screen  Negative ng/mL 10/17/22 1559    Benzodiazepine Screen  Negative ng/mL 10/17/22 1559    Methadone Screen  Negative ng/mL 10/17/22 1559    Phencyclidine Screen  Negative ng/mL 10/17/22 1559    Opiates Screen       THC Screen       Cocaine Screen       Propoxyphene Screen  Negative ng/mL 10/17/22 1559    Buprenorphine Screen       Methamphetamine Screen       Oxycodone Screen       Tricyclic Antidepressants Screen             Legend    ^: Historical                          Past OB History:       OB History    Para Term  AB Living   3 2 2 0 0 2   SAB IAB Ectopic Molar Multiple Live Births   0 0 0 0 0 2      # Outcome Date GA Lbr Abdi/2nd Weight Sex Delivery Anes PTL Lv   3 Current            2 Term 08/22/10   4508 g (9 lb 15 oz) F CS-LTranv   BAYLEE   1 Term 08   3459 g (7 lb 10 oz) F CS-LTranv   BAYLEE       Past Medical History: Past  Medical History:   Diagnosis Date   • Abnormal Pap smear of cervix 2022    LSIL HPV 16 +, HPV 18-   • Anxiety    • Depression    • Fetal renal anomaly, single gestation 4/3/2023   • HPV (human papilloma virus) infection 2022   • Trauma 07/3/2019   • Varicella 1991      Past Surgical History Past Surgical History:   Procedure Laterality Date   •  SECTION  3-7-2008 & 2010   • COLPOSCOPY  2022      Family History: Family History   Problem Relation Age of Onset   • Hypertension Paternal Grandfather    • Prostate cancer Paternal Grandfather    • Hypertension Paternal Grandmother    • Hypertension Paternal Aunt    • Hypertension Paternal Uncle    • Hypertension Father    • Breast cancer Neg Hx    • Ovarian cancer Neg Hx    • Uterine cancer Neg Hx    • Colon cancer Neg Hx    • Melanoma Neg Hx    • Deep vein thrombosis Neg Hx    • Pulmonary embolism Neg Hx    • Coronary artery disease Neg Hx    • Stroke Neg Hx    • Osteoporosis Neg Hx    • Diabetes Neg Hx       Social History:  reports that she has never smoked. She has never used smokeless tobacco.   reports current alcohol use.   reports no history of drug use.                   General ROS Negative Findings:Headaches, Visual Changes, Epigastric pain, Anorexia, Nausia/Vomiting and Vaginal Bleeding    ROS     All other systems have been reviewed and are neg  Objective       Vital Signs Range for the last 24 hours  Temperature: Temp:  [98.5 °F (36.9 °C)] 98.5 °F (36.9 °C)   Temp Source: Temp src: Oral   BP: BP: (132)/(85) 132/85   Pulse: Heart Rate:  [96] 96   Respirations: Resp:  [18] 18   SPO2:     O2 Amount (l/min):     O2 Devices     Weight:       Physical Examination:   General:   alert, appears stated age and cooperative   Skin:   normal   HEENT:     Lungs:      Heart:      Gastrointestinal:  Abdomen soft, gravid uterus nontender, no guarding benign exam negative CVA tenderness   Lower Extremities:  Trace edema, no calf tenderness   :  Exam deferred.  AmniSure negative   Musculoskeletal:     Neuro:  No focal deficits noted         Fetal Heart Rate Assessment   Method: Fetal HR Assessment Method: external   Beats/min: Fetal HR (beats/min): 125   Baseline: Fetal HR Baseline: normal range   Varibility: Fetal HR Variability: moderate (amplitude range 6 to 25 bpm)   Accels: Fetal HR Accelerations: greater than/equal to 15 bpm, lasting at least 15 seconds   Decels: Fetal HR Decelerations: absent   Tracing Category:     NST-indications possible rupture membranes, interpretation reactive, moderate variability, accelerations present 15 x 15 multiple, no fetal deceleration noted, onset , end time , irregular contractions noted.  Uterine Assessment   Method: Method: external tocotransducer   Frequency (min): Contraction Frequency (Minutes): x1   Ctx Count in 10 min:     Duration:     Intensity:     Intensity by IUPC:     Resting Tone:     Resting Tone by IUPC:     [place] Units:       Laboratory Results:   Lab Results (last 24 hours)     Procedure Component Value Units Date/Time    POC Amnisure [[id number]]  (Normal) Collected: 23    Specimen: Amniotic Fluid Updated: 23     POC Amnisure Negative        Radiology Review:   Imaging Results (Last 24 Hours)     ** No results found for the last 24 hours. **        Other Studies: Bedside ultrasound reveals single IUP vertex presentation, fetus active, max vertical pocket 6.5 cm.    Assessment & Plan       Pregnancy        Assessment:  1.  Intrauterine pregnancy at 37w6d weeks gestation with reactive fetal status.    2.  No evidence of labor or rupture membranes.  3.   x2 planned repeat  next week  4.      Plan:  1.  Discharged home, kick count, labor instruction given, follow-up OB provider in 2 days for schedule Pulmon or as needed  2. Plan of care has been reviewed with patient.  3.  Risks, benefits of treatment plan have been discussed.  4.  All questions have  been answered.  5      Tom Arellano, DO  5/14/2023  19:51 EDT

## 2023-05-16 ENCOUNTER — ROUTINE PRENATAL (OUTPATIENT)
Dept: OBSTETRICS AND GYNECOLOGY | Facility: CLINIC | Age: 38
End: 2023-05-16
Payer: COMMERCIAL

## 2023-05-16 VITALS — SYSTOLIC BLOOD PRESSURE: 120 MMHG | WEIGHT: 176 LBS | BODY MASS INDEX: 30.21 KG/M2 | DIASTOLIC BLOOD PRESSURE: 72 MMHG

## 2023-05-16 DIAGNOSIS — O09.523 MULTIGRAVIDA OF ADVANCED MATERNAL AGE IN THIRD TRIMESTER: Primary | ICD-10-CM

## 2023-05-16 DIAGNOSIS — Z34.93 THIRD TRIMESTER PREGNANCY: ICD-10-CM

## 2023-05-16 DIAGNOSIS — Z98.891 PREVIOUS CESAREAN SECTION: ICD-10-CM

## 2023-05-16 DIAGNOSIS — O35.EXX0 FETAL RENAL ANOMALY, SINGLE GESTATION: ICD-10-CM

## 2023-05-16 LAB
GLUCOSE UR STRIP-MCNC: NEGATIVE MG/DL
PROT UR STRIP-MCNC: NEGATIVE MG/DL

## 2023-05-16 NOTE — PROGRESS NOTES
OB FOLLOW UP  CC- Here for care of pregnancy        Reg Martinez is a 37 y.o.  38w1d patient being seen today for her obstetrical follow up visit. Patient reports no complaints. Pt feels well, good fm, freq ctx. No vb or lof  NST-AMA    Her prenatal care is complicated by (and status) : AMA  Patient Active Problem List   Diagnosis   • Previous  section   • Prenatal care, subsequent pregnancy in first trimester   • Antepartum multigravida of advanced maternal age   • HSIL on Pap smear of cervix   • Abnormality of placenta during pregnancy   • Fetal renal anomaly, single gestation   • Multigravida in third trimester   • Pregnancy   • Currently pregnant   • Multigravida of advanced maternal age in third trimester       GBS Status:   Group B Strep Culture   Date Value Ref Range Status   2023 No Group B Streptococcus isolated  Final         No Known Allergies       Flu Status: Already given in current flu season  Her Delivery Plan is: Repeat . Scheduled    US today: no  Non Stress Test: Yes minutes 20      ROS -   Patient Reports : Contractions  Patient Denies: Loss of Fluid, Vaginal Spotting, Vision Changes, Headaches, Nausea  and Vomiting   Fetal Movement : normal  All other systems reviewed and are negative.       The additional following portions of the patient's history were reviewed and updated as appropriate: allergies, current medications, past family history, past medical history, past social history, past surgical history and problem list.    I have reviewed and agree with the HPI, ROS, and historical information as entered above. Lisandra Rodriguez MD      EXAM:     Prenatal Vitals  BP: 120/72  Weight: 79.8 kg (176 lb)   Fetal Heart Rate: pos   Fundal Height (cm): 38 cm          Urine Glucose Read-only: Negative  Urine Protein Read-only: Negative       Assessment and Plan    Problem List Items Addressed This Visit     Previous  section    Overview     C/s x  2  Plan vasectomy         Relevant Medications    oxyCODONE (ROXICODONE) 5 MG immediate release tablet    Fetal renal anomaly, single gestation    Overview     Now normal         Multigravida of advanced maternal age in third trimester - Primary    Relevant Orders    Fetal Nonstress Test   Other Visit Diagnoses     Third trimester pregnancy        Relevant Orders    POC Urinalysis Dipstick (Completed)          1. Pregnancy at 38w1d  2. Fetal status reassuring.   3. Reviewed Pre-eclampsia signs/symptoms  4. Reviewed upcoming c/s and PAT instructions with patient. Arrival time and NPO status reviewed.   5. Delivery options reviewed with patient  6. Signs of labor reviewed  7. Kick counts reviewed  8. Activity and Exercise discussed.  Return in about 1 week (around 5/23/2023) for Next scheduled follow up.    Lisandra Rodriguez MD  05/16/2023

## 2023-05-22 ENCOUNTER — PRE-ADMISSION TESTING (OUTPATIENT)
Dept: PREADMISSION TESTING | Facility: HOSPITAL | Age: 38
End: 2023-05-22
Payer: COMMERCIAL

## 2023-05-22 ENCOUNTER — PREP FOR SURGERY (OUTPATIENT)
Dept: OTHER | Facility: HOSPITAL | Age: 38
End: 2023-05-22
Payer: COMMERCIAL

## 2023-05-22 ENCOUNTER — ROUTINE PRENATAL (OUTPATIENT)
Dept: OBSTETRICS AND GYNECOLOGY | Facility: CLINIC | Age: 38
End: 2023-05-22

## 2023-05-22 VITALS — WEIGHT: 175.6 LBS | DIASTOLIC BLOOD PRESSURE: 72 MMHG | BODY MASS INDEX: 30.73 KG/M2 | SYSTOLIC BLOOD PRESSURE: 120 MMHG

## 2023-05-22 VITALS — WEIGHT: 173.17 LBS | HEIGHT: 63 IN | BODY MASS INDEX: 30.68 KG/M2

## 2023-05-22 DIAGNOSIS — Z98.891 PREVIOUS CESAREAN SECTION: ICD-10-CM

## 2023-05-22 DIAGNOSIS — Z01.818 PRE-OP TESTING: Primary | ICD-10-CM

## 2023-05-22 DIAGNOSIS — O09.523 MULTIGRAVIDA OF ADVANCED MATERNAL AGE IN THIRD TRIMESTER: ICD-10-CM

## 2023-05-22 DIAGNOSIS — O09.529 ANTEPARTUM MULTIGRAVIDA OF ADVANCED MATERNAL AGE: ICD-10-CM

## 2023-05-22 DIAGNOSIS — Z98.891 PREVIOUS CESAREAN SECTION: Primary | ICD-10-CM

## 2023-05-22 DIAGNOSIS — Z34.93 PRENATAL CARE IN THIRD TRIMESTER: Primary | ICD-10-CM

## 2023-05-22 PROBLEM — Z34.90 CURRENTLY PREGNANT: Status: ACTIVE | Noted: 2023-05-22

## 2023-05-22 LAB
ABO GROUP BLD: NORMAL
BLD GP AB SCN SERPL QL: NEGATIVE
DEPRECATED RDW RBC AUTO: 42.2 FL (ref 37–54)
ERYTHROCYTE [DISTWIDTH] IN BLOOD BY AUTOMATED COUNT: 12.5 % (ref 12.3–15.4)
GLUCOSE UR STRIP-MCNC: NEGATIVE MG/DL
HCT VFR BLD AUTO: 39.2 % (ref 34–46.6)
HGB BLD-MCNC: 13.9 G/DL (ref 12–15.9)
MCH RBC QN AUTO: 32.8 PG (ref 26.6–33)
MCHC RBC AUTO-ENTMCNC: 35.5 G/DL (ref 31.5–35.7)
MCV RBC AUTO: 92.5 FL (ref 79–97)
PLATELET # BLD AUTO: 158 10*3/MM3 (ref 140–450)
PMV BLD AUTO: 11.9 FL (ref 6–12)
PROT UR STRIP-MCNC: NEGATIVE MG/DL
RBC # BLD AUTO: 4.24 10*6/MM3 (ref 3.77–5.28)
RH BLD: POSITIVE
T&S EXPIRATION DATE: NORMAL
WBC NRBC COR # BLD: 5.96 10*3/MM3 (ref 3.4–10.8)

## 2023-05-22 PROCEDURE — 85027 COMPLETE CBC AUTOMATED: CPT

## 2023-05-22 PROCEDURE — 86850 RBC ANTIBODY SCREEN: CPT

## 2023-05-22 PROCEDURE — 86901 BLOOD TYPING SEROLOGIC RH(D): CPT

## 2023-05-22 PROCEDURE — 86900 BLOOD TYPING SEROLOGIC ABO: CPT

## 2023-05-22 PROCEDURE — 36415 COLL VENOUS BLD VENIPUNCTURE: CPT

## 2023-05-22 RX ORDER — SODIUM CHLORIDE 0.9 % (FLUSH) 0.9 %
3 SYRINGE (ML) INJECTION EVERY 12 HOURS SCHEDULED
Status: CANCELLED | OUTPATIENT
Start: 2023-05-22

## 2023-05-22 RX ORDER — SODIUM CHLORIDE, SODIUM LACTATE, POTASSIUM CHLORIDE, CALCIUM CHLORIDE 600; 310; 30; 20 MG/100ML; MG/100ML; MG/100ML; MG/100ML
100 INJECTION, SOLUTION INTRAVENOUS CONTINUOUS
Status: CANCELLED | OUTPATIENT
Start: 2023-05-22

## 2023-05-22 RX ORDER — ACETAMINOPHEN 500 MG
1000 TABLET ORAL ONCE
Status: CANCELLED | OUTPATIENT
Start: 2023-05-22

## 2023-05-22 RX ORDER — SODIUM CHLORIDE 9 MG/ML
40 INJECTION, SOLUTION INTRAVENOUS AS NEEDED
Status: CANCELLED | OUTPATIENT
Start: 2023-05-22

## 2023-05-22 RX ORDER — SODIUM CHLORIDE 0.9 % (FLUSH) 0.9 %
3-10 SYRINGE (ML) INJECTION AS NEEDED
Status: CANCELLED | OUTPATIENT
Start: 2023-05-22

## 2023-05-22 RX ORDER — CEFAZOLIN SODIUM 2 G/100ML
2 INJECTION, SOLUTION INTRAVENOUS ONCE
Status: CANCELLED | OUTPATIENT
Start: 2023-05-22 | End: 2023-05-22

## 2023-05-22 NOTE — DISCHARGE INSTRUCTIONS
What to know before your arrive:    -Do not eat, drink or chew gum beginning 8 hours before your scheduled arrival time to the hospital.  Except please drink 20 ounces of Gatorade and complete two hours before your given arrival time to the hospital.  If you drink too close to surgery time, your sugery may  be delayed or cancelled.  Please complete as instructed.     -Do not shave any part of your body including abdomen or pelvic are for two days before your procedure.  -If you are taking a scheduled medication (insulin, blood pressure medicine,antibiotics) please consult with your physician whether to take on the day of surgery.  -Remove all jewelry including rings, wedding bands, and piercing before coming to the hospital.  -Leave important valuables at home.  -Do not wear dark fingernail polish.  -Please take two Tylenol 500 mg tablets the evening before surgery.  -Bring the following with you to the hospital:    -Picture ID and insurance, Medicare or Medicaid cards    -Co-pay/deductible required by insurance (Cash, Check, Credit Card)    -Copy of living will or power  document (if applicable)    -CPAP mask and tubing, not machine (if applicable)    -Skin prep instructions sheet    What to know the day of procedure:    -Park in the [facility], take elevator for first floor, exit to the right and  proceed through the doors to outside, follow the covered sidewalk to the entrance of the [place], follow the hallway and signs [facility], enter the [place] to your right BEFORE entering the 1720 lobby.  Take the elevators to the 3rd floor ([address] [place]).  -Leave unnecessary items in your vehicle, including your suitcase.  Your support  person or a family member can get it for you after your procedure.   -Check in at the reception desk in the lobby of the 3rd floor ([address] [place]).   -One person may accompany you to the pre-op/recovery area.  Please have  other family members wait in the  waiting room.   -An anesthesiologist will meet with your prior to your procedure.   -After anesthesia has been initiated, one person may accompany you in the  operating room.   -No video cameras are permitted in the operating room; only still cameras,  Please.      What to expect while you are in recovery:     -One person may stay with you while you are in recovery.   -If the baby is stable, he/she may visit to initiate breastfeeding & [facility].      CHLORHEXIDINE GLUCONATE WIPES AND INSTRUCTIONS GIVEN TO PATIENT

## 2023-05-22 NOTE — PROGRESS NOTES
OB FOLLOW UP  CC- Here for care of pregnancy        Reg Martinez is a 37 y.o.  39w0d patient being seen today for her obstetrical follow up visit. Patient reports no complaints..     Her prenatal care is complicated by (and status) : AMA  Patient Active Problem List   Diagnosis    Previous  section    Prenatal care, subsequent pregnancy in first trimester    Antepartum multigravida of advanced maternal age    HSIL on Pap smear of cervix    Abnormality of placenta during pregnancy    Fetal renal anomaly, single gestation    Multigravida in third trimester    Pregnancy    Currently pregnant    Multigravida of advanced maternal age in third trimester       GBS Status:   Group B Strep Culture   Date Value Ref Range Status   2023 No Group B Streptococcus isolated  Final         No Known Allergies     Her Delivery Plan is: Repeat . Scheduled    US today: no  Non Stress Test: Yes minutes >20  non-stress test: NST: Reactive  indication: Advanced Maternal Age      ROS -   Patient Reports : No Problems  Patient Denies: Loss of Fluid, Vaginal Spotting, Vision Changes, Headaches, Contractions and Epigastric pain  Fetal Movement : normal  All other systems reviewed and are negative.       The additional following portions of the patient's history were reviewed and updated as appropriate: allergies, current medications, past family history, past medical history, past social history, past surgical history and problem list.    I have reviewed and agree with the HPI, ROS, and historical information as entered above. Lisandra Rodriguez MD    Fetal Non-Stress Test  Patient: Reg Martinez  : 1985  MRN: [id number]  CSN: [id number]  Date: 2023    Estimated Date of Delivery: 23  Gestational Age: 39w0d    Indication for NST AMA                                               EXAM:     Prenatal Vitals  BP: 120/72  Weight: 79.7 kg (175 lb 9.6 oz)   Fetal Heart Rate: pos    Fundal Height (cm): 38 cm          Urine Glucose Read-only: Negative  Urine Protein Read-only: Negative       Assessment and Plan    Problem List Items Addressed This Visit       Previous  section    Overview     C/s x 2  Plan vasectomy         Antepartum multigravida of advanced maternal age     Other Visit Diagnoses       Prenatal care in third trimester    -  Primary    Relevant Orders    POC Urinalysis Dipstick (Completed)    Multigravida of advanced maternal age in third trimester        Relevant Orders    Fetal Nonstress Test            Pregnancy at 39w0d  Fetal status reassuring.   Reviewed Pre-eclampsia signs/symptoms  Reviewed upcoming c/s and PAT instructions with patient. Arrival time and NPO status reviewed.   Delivery options reviewed with patient  Signs of labor reviewed  Kick counts reviewed  Activity and Exercise discussed.  Return for FOR PP VISIT.    Lisandra Rodriguez MD  2023

## 2023-05-22 NOTE — PAT
Patient to apply Chlorhexadine wipes  to surgical area (as instructed) the night before procedure and the AM of procedure. Wipes provided.    Patient instructed to drink 20 ounces of Gatorade and it needs to be completed 1 hour (for Main OR patients) or 2 hours (scheduled  section & [facility] patients) before given arrival time for procedure (NO RED Gatorade)    Patient verbalized understanding.    Patient viewed general PAT education video as instructed in their preoperative information received from their surgeon.  Patient stated the general PAT education video was viewed in its entirety and survey completed.  Copies of [facility] general education handouts (Incentive Spirometry, Meds to Beds Program, Patient Belongings, Pre-op skin preparation instructions, Blood Glucose testing, Visitor policy, Surgery FAQ, Code H) distributed to patient if not printed. Education related to the PAT pass and skin preparation for surgery (if applicable) completed in PAT as a reinforcement to PAT education video. Patient instructed to return PAT pass provided today as well as completed skin preparation sheet (if applicable) on the day of procedure.     Additionally if patient had not viewed video yet but intended to view it at home or in our waiting area, then referred them to the handout with QR code/link provided during PAT visit.  Instructed patient to complete survey after viewing the video in its entirety.  Encouraged patient/family to read PAT general education handouts thoroughly and notify PAT staff with any questions or concerns. Patient verbalized understanding of all information and priority content.    Blood bank bracelet applied to patient during Pre Admission Testing visit.  Patient instructed not to remove from arm until after procedure and they are discharged from the hospital.  Explained to patient that they may shower and get the bracelet wet, but not to immerse under water for longer periods (bathing, swimming,  hand dishwashing, etc).  Patient verbalized understanding.    Instructed patient to take two Tylenol extra strength (total of 1000 mg) the night before surgery.

## 2023-05-23 ENCOUNTER — HOSPITAL ENCOUNTER (INPATIENT)
Facility: HOSPITAL | Age: 38
LOS: 2 days | Discharge: HOME | End: 2023-05-25
Attending: OBSTETRICS & GYNECOLOGY | Admitting: OBSTETRICS & GYNECOLOGY
Payer: COMMERCIAL

## 2023-05-23 ENCOUNTER — ANESTHESIA EVENT (OUTPATIENT)
Dept: LABOR AND DELIVERY | Facility: HOSPITAL | Age: 38
End: 2023-05-23
Payer: COMMERCIAL

## 2023-05-23 ENCOUNTER — ANESTHESIA (OUTPATIENT)
Dept: LABOR AND DELIVERY | Facility: HOSPITAL | Age: 38
End: 2023-05-23
Payer: COMMERCIAL

## 2023-05-23 DIAGNOSIS — Z98.891 PREVIOUS CESAREAN SECTION: ICD-10-CM

## 2023-05-23 LAB
ABO GROUP BLD: NORMAL
RH BLD: POSITIVE

## 2023-05-23 PROCEDURE — 0 MORPHINE PER 10 MG: Performed by: NURSE ANESTHETIST, CERTIFIED REGISTERED

## 2023-05-23 PROCEDURE — 25010000002 KETOROLAC TROMETHAMINE PER 15 MG: Performed by: OBSTETRICS & GYNECOLOGY

## 2023-05-23 PROCEDURE — 25010000002 ONDANSETRON PER 1 MG: Performed by: NURSE ANESTHETIST, CERTIFIED REGISTERED

## 2023-05-23 PROCEDURE — S0260 H&P FOR SURGERY: HCPCS | Performed by: OBSTETRICS & GYNECOLOGY

## 2023-05-23 PROCEDURE — 25010000002 CEFAZOLIN IN DEXTROSE 2-4 GM/100ML-% SOLUTION: Performed by: OBSTETRICS & GYNECOLOGY

## 2023-05-23 PROCEDURE — 25010000002 FENTANYL CITRATE (PF) 50 MCG/ML SOLUTION: Performed by: NURSE ANESTHETIST, CERTIFIED REGISTERED

## 2023-05-23 PROCEDURE — 25010000002 MIDAZOLAM PER 1 MG: Performed by: NURSE ANESTHETIST, CERTIFIED REGISTERED

## 2023-05-23 PROCEDURE — 86900 BLOOD TYPING SEROLOGIC ABO: CPT

## 2023-05-23 PROCEDURE — 59025 FETAL NON-STRESS TEST: CPT

## 2023-05-23 PROCEDURE — 25010000002 FENTANYL CITRATE (PF) 100 MCG/2ML SOLUTION: Performed by: NURSE ANESTHETIST, CERTIFIED REGISTERED

## 2023-05-23 PROCEDURE — 59510 CESAREAN DELIVERY: CPT | Performed by: OBSTETRICS & GYNECOLOGY

## 2023-05-23 PROCEDURE — 86901 BLOOD TYPING SEROLOGIC RH(D): CPT

## 2023-05-23 RX ORDER — BUPIVACAINE HYDROCHLORIDE 7.5 MG/ML
INJECTION, SOLUTION INTRASPINAL AS NEEDED
Status: DISCONTINUED | OUTPATIENT
Start: 2023-05-23 | End: 2023-05-23 | Stop reason: SURG

## 2023-05-23 RX ORDER — FENTANYL CITRATE 50 UG/ML
INJECTION, SOLUTION INTRAMUSCULAR; INTRAVENOUS AS NEEDED
Status: DISCONTINUED | OUTPATIENT
Start: 2023-05-23 | End: 2023-05-23 | Stop reason: SURG

## 2023-05-23 RX ORDER — ONDANSETRON 4 MG/1
4 TABLET, FILM COATED ORAL EVERY 8 HOURS PRN
Status: DISCONTINUED | OUTPATIENT
Start: 2023-05-23 | End: 2023-05-25 | Stop reason: HOSPADM

## 2023-05-23 RX ORDER — OXYCODONE HYDROCHLORIDE 5 MG/1
5 TABLET ORAL EVERY 4 HOURS PRN
Status: DISCONTINUED | OUTPATIENT
Start: 2023-05-23 | End: 2023-05-25 | Stop reason: HOSPADM

## 2023-05-23 RX ORDER — ACETAMINOPHEN 325 MG/1
650 TABLET ORAL EVERY 6 HOURS
Status: DISCONTINUED | OUTPATIENT
Start: 2023-05-24 | End: 2023-05-25 | Stop reason: HOSPADM

## 2023-05-23 RX ORDER — MISOPROSTOL 200 UG/1
600 TABLET ORAL AS NEEDED
Status: DISCONTINUED | OUTPATIENT
Start: 2023-05-23 | End: 2023-05-25 | Stop reason: HOSPADM

## 2023-05-23 RX ORDER — SODIUM CHLORIDE 9 MG/ML
40 INJECTION, SOLUTION INTRAVENOUS AS NEEDED
Status: DISCONTINUED | OUTPATIENT
Start: 2023-05-23 | End: 2023-05-25 | Stop reason: HOSPADM

## 2023-05-23 RX ORDER — OXYTOCIN/0.9 % SODIUM CHLORIDE 30/500 ML
PLASTIC BAG, INJECTION (ML) INTRAVENOUS AS NEEDED
Status: DISCONTINUED | OUTPATIENT
Start: 2023-05-23 | End: 2023-05-23 | Stop reason: SURG

## 2023-05-23 RX ORDER — IBUPROFEN 600 MG/1
600 TABLET ORAL EVERY 6 HOURS
Status: DISCONTINUED | OUTPATIENT
Start: 2023-05-24 | End: 2023-05-25 | Stop reason: HOSPADM

## 2023-05-23 RX ORDER — SODIUM CHLORIDE 0.9 % (FLUSH) 0.9 %
3-10 SYRINGE (ML) INJECTION AS NEEDED
Status: DISCONTINUED | OUTPATIENT
Start: 2023-05-23 | End: 2023-05-25 | Stop reason: HOSPADM

## 2023-05-23 RX ORDER — ONDANSETRON 2 MG/ML
INJECTION INTRAMUSCULAR; INTRAVENOUS AS NEEDED
Status: DISCONTINUED | OUTPATIENT
Start: 2023-05-23 | End: 2023-05-23 | Stop reason: SURG

## 2023-05-23 RX ORDER — DIPHENHYDRAMINE HYDROCHLORIDE 50 MG/ML
25 INJECTION INTRAMUSCULAR; INTRAVENOUS ONCE AS NEEDED
Status: DISCONTINUED | OUTPATIENT
Start: 2023-05-23 | End: 2023-05-25 | Stop reason: HOSPADM

## 2023-05-23 RX ORDER — OXYCODONE HYDROCHLORIDE 5 MG/1
10 TABLET ORAL EVERY 4 HOURS PRN
Status: DISCONTINUED | OUTPATIENT
Start: 2023-05-23 | End: 2023-05-25 | Stop reason: HOSPADM

## 2023-05-23 RX ORDER — PRENATAL VIT/IRON FUM/FOLIC AC 27MG-0.8MG
1 TABLET ORAL DAILY
Status: DISCONTINUED | OUTPATIENT
Start: 2023-05-23 | End: 2023-05-23

## 2023-05-23 RX ORDER — PHENYLEPHRINE HCL IN 0.9% NACL 1 MG/10 ML
SYRINGE (ML) INTRAVENOUS AS NEEDED
Status: DISCONTINUED | OUTPATIENT
Start: 2023-05-23 | End: 2023-05-23 | Stop reason: SURG

## 2023-05-23 RX ORDER — SIMETHICONE 80 MG
80 TABLET,CHEWABLE ORAL 4 TIMES DAILY PRN
Status: DISCONTINUED | OUTPATIENT
Start: 2023-05-23 | End: 2023-05-25 | Stop reason: HOSPADM

## 2023-05-23 RX ORDER — SODIUM CHLORIDE 0.9 % (FLUSH) 0.9 %
10 SYRINGE (ML) INJECTION EVERY 12 HOURS SCHEDULED
Status: DISCONTINUED | OUTPATIENT
Start: 2023-05-23 | End: 2023-05-25 | Stop reason: HOSPADM

## 2023-05-23 RX ORDER — CALCIUM CARBONATE 500 MG/1
1 TABLET, CHEWABLE ORAL EVERY 4 HOURS PRN
Status: DISCONTINUED | OUTPATIENT
Start: 2023-05-23 | End: 2023-05-25 | Stop reason: HOSPADM

## 2023-05-23 RX ORDER — METHYLERGONOVINE MALEATE 0.2 MG/ML
200 INJECTION INTRAVENOUS AS NEEDED
Status: DISCONTINUED | OUTPATIENT
Start: 2023-05-23 | End: 2023-05-25 | Stop reason: HOSPADM

## 2023-05-23 RX ORDER — DIPHENHYDRAMINE HYDROCHLORIDE 50 MG/ML
25 INJECTION INTRAMUSCULAR; INTRAVENOUS EVERY 4 HOURS PRN
Status: DISCONTINUED | OUTPATIENT
Start: 2023-05-23 | End: 2023-05-25 | Stop reason: HOSPADM

## 2023-05-23 RX ORDER — ACETAMINOPHEN 500 MG
1000 TABLET ORAL ONCE
Status: COMPLETED | OUTPATIENT
Start: 2023-05-23 | End: 2023-05-23

## 2023-05-23 RX ORDER — NALOXONE HCL 0.4 MG/ML
0.4 VIAL (ML) INJECTION
Status: ACTIVE | OUTPATIENT
Start: 2023-05-23 | End: 2023-05-24

## 2023-05-23 RX ORDER — SODIUM CHLORIDE 0.9 % (FLUSH) 0.9 %
3 SYRINGE (ML) INJECTION EVERY 12 HOURS SCHEDULED
Status: DISCONTINUED | OUTPATIENT
Start: 2023-05-23 | End: 2023-05-25 | Stop reason: HOSPADM

## 2023-05-23 RX ORDER — TRISODIUM CITRATE DIHYDRATE AND CITRIC ACID MONOHYDRATE 500; 334 MG/5ML; MG/5ML
30 SOLUTION ORAL ONCE
Status: COMPLETED | OUTPATIENT
Start: 2023-05-23 | End: 2023-05-23

## 2023-05-23 RX ORDER — DIPHENHYDRAMINE HCL 25 MG
25 CAPSULE ORAL EVERY 4 HOURS PRN
Status: DISCONTINUED | OUTPATIENT
Start: 2023-05-23 | End: 2023-05-25 | Stop reason: HOSPADM

## 2023-05-23 RX ORDER — DOCUSATE SODIUM 100 MG/1
100 CAPSULE, LIQUID FILLED ORAL 2 TIMES DAILY PRN
Status: DISCONTINUED | OUTPATIENT
Start: 2023-05-23 | End: 2023-05-25 | Stop reason: HOSPADM

## 2023-05-23 RX ORDER — MIDAZOLAM HYDROCHLORIDE 1 MG/ML
INJECTION INTRAMUSCULAR; INTRAVENOUS AS NEEDED
Status: DISCONTINUED | OUTPATIENT
Start: 2023-05-23 | End: 2023-05-23 | Stop reason: SURG

## 2023-05-23 RX ORDER — HYDROCORTISONE 25 MG/G
1 CREAM TOPICAL AS NEEDED
Status: DISCONTINUED | OUTPATIENT
Start: 2023-05-23 | End: 2023-05-25 | Stop reason: HOSPADM

## 2023-05-23 RX ORDER — ALUMINA, MAGNESIA, AND SIMETHICONE 2400; 2400; 240 MG/30ML; MG/30ML; MG/30ML
15 SUSPENSION ORAL EVERY 4 HOURS PRN
Status: DISCONTINUED | OUTPATIENT
Start: 2023-05-23 | End: 2023-05-25 | Stop reason: HOSPADM

## 2023-05-23 RX ORDER — KETOROLAC TROMETHAMINE 30 MG/ML
30 INJECTION, SOLUTION INTRAMUSCULAR; INTRAVENOUS EVERY 6 HOURS PRN
Status: DISCONTINUED | OUTPATIENT
Start: 2023-05-23 | End: 2023-05-25 | Stop reason: HOSPADM

## 2023-05-23 RX ORDER — NALBUPHINE HYDROCHLORIDE 10 MG/ML
3 INJECTION, SOLUTION INTRAMUSCULAR; INTRAVENOUS; SUBCUTANEOUS EVERY 4 HOURS PRN
Status: ACTIVE | OUTPATIENT
Start: 2023-05-23 | End: 2023-05-24

## 2023-05-23 RX ORDER — SODIUM CHLORIDE, SODIUM LACTATE, POTASSIUM CHLORIDE, CALCIUM CHLORIDE 600; 310; 30; 20 MG/100ML; MG/100ML; MG/100ML; MG/100ML
100 INJECTION, SOLUTION INTRAVENOUS CONTINUOUS
Status: DISCONTINUED | OUTPATIENT
Start: 2023-05-23 | End: 2023-05-25 | Stop reason: HOSPADM

## 2023-05-23 RX ORDER — SODIUM CHLORIDE, SODIUM LACTATE, POTASSIUM CHLORIDE, CALCIUM CHLORIDE 600; 310; 30; 20 MG/100ML; MG/100ML; MG/100ML; MG/100ML
125 INJECTION, SOLUTION INTRAVENOUS CONTINUOUS
Status: DISCONTINUED | OUTPATIENT
Start: 2023-05-23 | End: 2023-05-23

## 2023-05-23 RX ORDER — PRENATAL VIT/IRON FUM/FOLIC AC 27MG-0.8MG
1 TABLET ORAL DAILY
Status: DISCONTINUED | OUTPATIENT
Start: 2023-05-23 | End: 2023-05-25 | Stop reason: HOSPADM

## 2023-05-23 RX ORDER — KETOROLAC TROMETHAMINE 15 MG/ML
15 INJECTION, SOLUTION INTRAMUSCULAR; INTRAVENOUS EVERY 6 HOURS
Status: COMPLETED | OUTPATIENT
Start: 2023-05-23 | End: 2023-05-24

## 2023-05-23 RX ORDER — FAMOTIDINE 10 MG/ML
INJECTION, SOLUTION INTRAVENOUS AS NEEDED
Status: DISCONTINUED | OUTPATIENT
Start: 2023-05-23 | End: 2023-05-23 | Stop reason: SURG

## 2023-05-23 RX ORDER — SODIUM CHLORIDE 0.9 % (FLUSH) 0.9 %
1-10 SYRINGE (ML) INJECTION AS NEEDED
Status: DISCONTINUED | OUTPATIENT
Start: 2023-05-23 | End: 2023-05-25 | Stop reason: HOSPADM

## 2023-05-23 RX ORDER — MORPHINE SULFATE 0.5 MG/ML
INJECTION, SOLUTION EPIDURAL; INTRATHECAL; INTRAVENOUS AS NEEDED
Status: DISCONTINUED | OUTPATIENT
Start: 2023-05-23 | End: 2023-05-23 | Stop reason: SURG

## 2023-05-23 RX ORDER — CEFAZOLIN SODIUM 2 G/100ML
2 INJECTION, SOLUTION INTRAVENOUS ONCE
Status: COMPLETED | OUTPATIENT
Start: 2023-05-23 | End: 2023-05-23

## 2023-05-23 RX ORDER — ONDANSETRON 2 MG/ML
4 INJECTION INTRAMUSCULAR; INTRAVENOUS ONCE AS NEEDED
Status: ACTIVE | OUTPATIENT
Start: 2023-05-23 | End: 2023-05-24

## 2023-05-23 RX ORDER — CARBOPROST TROMETHAMINE 250 UG/ML
250 INJECTION, SOLUTION INTRAMUSCULAR AS NEEDED
Status: DISCONTINUED | OUTPATIENT
Start: 2023-05-23 | End: 2023-05-25 | Stop reason: HOSPADM

## 2023-05-23 RX ORDER — ACETAMINOPHEN 500 MG
1000 TABLET ORAL ONCE
COMMUNITY
End: 2023-05-25 | Stop reason: HOSPADM

## 2023-05-23 RX ORDER — ACETAMINOPHEN 500 MG
1000 TABLET ORAL EVERY 6 HOURS
Status: COMPLETED | OUTPATIENT
Start: 2023-05-23 | End: 2023-05-24

## 2023-05-23 RX ADMIN — FAMOTIDINE 20 MG: 10 INJECTION, SOLUTION INTRAVENOUS at 07:42

## 2023-05-23 RX ADMIN — Medication 100 MCG: at 08:01

## 2023-05-23 RX ADMIN — SODIUM CHLORIDE, POTASSIUM CHLORIDE, SODIUM LACTATE AND CALCIUM CHLORIDE 1000 ML/HR: 600; 310; 30; 20 INJECTION, SOLUTION INTRAVENOUS at 07:29

## 2023-05-23 RX ADMIN — ACETAMINOPHEN 1000 MG: 500 TABLET ORAL at 06:58

## 2023-05-23 RX ADMIN — BUPIVACAINE HYDROCHLORIDE IN DEXTROSE 1.4 ML: 7.5 INJECTION, SOLUTION SUBARACHNOID at 07:51

## 2023-05-23 RX ADMIN — SIMETHICONE 80 MG: 80 TABLET, CHEWABLE ORAL at 20:47

## 2023-05-23 RX ADMIN — MIDAZOLAM HYDROCHLORIDE 1 MG: 1 INJECTION, SOLUTION INTRAMUSCULAR; INTRAVENOUS at 07:42

## 2023-05-23 RX ADMIN — SODIUM CHLORIDE, POTASSIUM CHLORIDE, SODIUM LACTATE AND CALCIUM CHLORIDE 1000 ML/HR: 600; 310; 30; 20 INJECTION, SOLUTION INTRAVENOUS at 06:50

## 2023-05-23 RX ADMIN — Medication 500 ML: at 08:22

## 2023-05-23 RX ADMIN — Medication 10 ML: at 13:00

## 2023-05-23 RX ADMIN — OXYCODONE 5 MG: 5 TABLET ORAL at 13:30

## 2023-05-23 RX ADMIN — PRENATAL VITAMINS-IRON FUMARATE 27 MG IRON-FOLIC ACID 0.8 MG TABLET 1 TABLET: at 20:47

## 2023-05-23 RX ADMIN — OXYCODONE 10 MG: 5 TABLET ORAL at 17:26

## 2023-05-23 RX ADMIN — MORPHINE SULFATE 150 MCG: 0.5 INJECTION, SOLUTION EPIDURAL; INTRATHECAL; INTRAVENOUS at 07:51

## 2023-05-23 RX ADMIN — SODIUM CHLORIDE, POTASSIUM CHLORIDE, SODIUM LACTATE AND CALCIUM CHLORIDE: 600; 310; 30; 20 INJECTION, SOLUTION INTRAVENOUS at 08:04

## 2023-05-23 RX ADMIN — FENTANYL CITRATE 50 MCG: 50 INJECTION, SOLUTION INTRAMUSCULAR; INTRAVENOUS at 08:23

## 2023-05-23 RX ADMIN — DOCUSATE SODIUM 100 MG: 100 CAPSULE, LIQUID FILLED ORAL at 20:47

## 2023-05-23 RX ADMIN — SODIUM CITRATE AND CITRIC ACID MONOHYDRATE 30 ML: 500; 334 SOLUTION ORAL at 07:38

## 2023-05-23 RX ADMIN — FENTANYL CITRATE 20 MCG: 50 INJECTION, SOLUTION INTRAMUSCULAR; INTRAVENOUS at 07:51

## 2023-05-23 RX ADMIN — ACETAMINOPHEN 1000 MG: 500 TABLET ORAL at 17:26

## 2023-05-23 RX ADMIN — ONDANSETRON 4 MG: 2 INJECTION INTRAMUSCULAR; INTRAVENOUS at 07:42

## 2023-05-23 RX ADMIN — ACETAMINOPHEN 1000 MG: 500 TABLET ORAL at 12:04

## 2023-05-23 RX ADMIN — KETOROLAC TROMETHAMINE 15 MG: 15 INJECTION, SOLUTION INTRAMUSCULAR; INTRAVENOUS at 22:08

## 2023-05-23 RX ADMIN — KETOROLAC TROMETHAMINE 15 MG: 15 INJECTION, SOLUTION INTRAMUSCULAR; INTRAVENOUS at 16:04

## 2023-05-23 RX ADMIN — KETOROLAC TROMETHAMINE 30 MG: 30 INJECTION, SOLUTION INTRAMUSCULAR; INTRAVENOUS at 09:57

## 2023-05-23 RX ADMIN — CEFAZOLIN SODIUM 2 G: 2 INJECTION, SOLUTION INTRAVENOUS at 07:30

## 2023-05-23 NOTE — LACTATION NOTE
05/23/23 1400   Maternal Information   Date of Referral 05/23/23   Person Making Referral lactation consultant   Maternal Reason for Referral breastfeeding currently  (Mom states infant has latched and nursed well since delivery.  Breastfeeding education provided, information given.  Encouraged mom to call for assistance prn.  Mom has new, insurance provided breast pump at home.)   Maternal Infant Feeding   Maternal Emotional State receptive;independent   Milk Expression/Equipment   Breast Pump Type double electric, personal

## 2023-05-23 NOTE — ANESTHESIA POSTPROCEDURE EVALUATION
Patient: Reg Martinez    Procedure Summary     Date: 23 Room / Location: [facility] LABOR DELIVERY   [facility] LABOR DELIVERY    Anesthesia Start: 741 Anesthesia Stop: 847    Procedure:  SECTION REPEAT (Abdomen) Diagnosis:     Surgeons: Lisandra Rodriguez MD Provider: Michael Dias MD    Anesthesia Type: ITN, spinal ASA Status: 2          Anesthesia Type: ITN, spinal    Vitals  Vitals Value Taken Time   /66 23 0845   Temp 97.6 °F (36.4 °C) 23 0840   Pulse 89 23 0848   Resp 16 23 0840   SpO2 99 % 23 0848   Vitals shown include unvalidated device data.        Post Anesthesia Care and Evaluation    Patient location during evaluation: bedside  Patient participation: complete - patient participated  Level of consciousness: awake and alert  Pain management: adequate    Airway patency: patent  Anesthetic complications: No anesthetic complications    Cardiovascular status: acceptable  Respiratory status: acceptable  Hydration status: acceptable

## 2023-05-23 NOTE — OP NOTE
Operative Note    Patient name: Reg Martinez  YOB: 1985   MRN: [id number]  Admission Date: 2023  Referring Provider: Lisandra Rodriguez*    ID: 37 y.o.  at 39w1d    Preoperative Diagnosis:   Currently pregnant    Previous  section    Fetal renal anomaly, single gestation       Postoperative Diagnosis: Same as above.    Procedure(s): repeatlow transverse  delivery     Surgeons: Surgeon(s) and Role:     * Lisandra Rodriugez MD - Primary    Anesthesia: Spinal    Estimated Blood Loss: 600 mL    Preoperative antibiotic: Ancef (cefazolin) 2 grams    Blood products:   Blood Administration Record (From admission, onward)    None          Pathology:   Order Name Source Comment Collection Info Order Time   ABO/RH SPECIMEN VERIFICATION PREOP   Collected By: Negrita Abrams RN 2023  6:21 AM     Release to patient   Routine Release            Drains: Duarte catheter to gravity    Complications: None    Condition: Stable to recovery room                                          Infant:                Gender: male  infant    Weight: No birth weight on file.     Apgars: 8  @ 1 minute /     9  @ 5 minutes    Cord gases: Venous:  No components found for:  PHCVEN,  BECVEN      Arterial:  No components found for:  PHCART,  BECART          Operative Summary:   After obtaining informed consent the patient was taken to the operating room where adequate anesthesia was obtained.  Duarte catheter was placed in the bladder preoperatively.  IV antibiotics were given preoperatively.       The abdomen was prepped and draped in the usual sterile fashion for  delivery.  After confirming adequate anesthesia a Pfannenstiel skin incision was made with the scalpel and carried through to the underlying layer of fascia.  The fascia was incised in the midline and the incision extended laterally with the Owusu scissors and with blunt dissection.       The upper aspect of the fascia  was grasped with 2 Kocher clamps, elevated, and dissected off the underlying rectus muscles bluntly and with the Owusu scissors.  The Kocher clamps were removed and applied to the inferior aspect of the fascia.  The fascia was dissected off of the rectus muscles in the same fashion.  The peritoneum was entered bluntly.  The incision was stretched and the bladder blade and Harris retractor inserted for visualization of the uterus.       The uterus was incised with the scalpel in a low transverse fashion.  The uterine incision was entered digitally and the incision extended bluntly in a cranial-caudal fashion.  Retractors were removed and membranes were ruptured.  The infant was delivered atraumatically from vertex presentation.  The umbilical cord was clamped and cut and the nose and mouth bulb suctioned.  The infant was handed off to waiting pediatric staff.       Cord blood gases were not collected.  Cord blood was collected.  The placenta was removed using cord traction and uterine massage.  The uterus was exteriorized and cleared of all clots and debris.  The uterine incision was repaired with #1 Vicryl in a running locked fashion. A single-layer technique was used.  Additional hemostatic measures required: none.    The incision was inspected and excellent hemostasis was noted.  The tubes and ovaries were noted to be normal.  The uterus was returned to the abdomen.  The gutters were cleared of all clots and debris.  Irrigation was used.  The uterine incision was again inspected and found to be hemostatic.       The peritoneum was reapproximated with 2-0 Vicryl.  The fascia was closed with #1 Vicryl in a running fashion.  The subcutaneous space was reapproximated using 2-0 Vicryl.      The skin was closed using 3-0 Vicryl.  The patient was transferred to the recovery room in stable condition.    Lisandra Rodriguez MD  5/23/2023

## 2023-05-23 NOTE — H&P
[facility]  Reg Martinez  : 1985  MRN: [id number]  CSN: [id number]    History and Physical    Subjective   Reg Martinez is a 37 y.o. year old  with an Estimated Date of Delivery: 23 scheduled for  delivery due to previous C/S - not a  candidate.   She is not planning for sterilization at the time of the .    Prenatal care has been with myself.  It has been complicated by AMA (genetic screening was normal).    OB History    Para Term  AB Living   4 2 2 0 1 2   SAB IAB Ectopic Molar Multiple Live Births   1 0 0 0 0 2      # Outcome Date GA Lbr Abdi/2nd Weight Sex Delivery Anes PTL Lv   4 Current            3 SAB 2022 6w0d          2 Term 08/22/10   4508 g (9 lb 15 oz) F CS-LTranv   BAYLEE   1 Term 08   3459 g (7 lb 10 oz) F CS-LTranv   BAYLEE     Past Medical History:   Diagnosis Date   • Abnormal Pap smear of cervix 2022    LSIL HPV 16 +, HPV 18-   • Anxiety    • Depression    • Fetal renal anomaly, single gestation 4/3/2023   • HPV (human papilloma virus) infection 2022   • Trauma 07/3/2019   • Varicella 1991     Past Surgical History:   Procedure Laterality Date   •  SECTION  3-7-2008 & 2010   • COLPOSCOPY  2022   • FOOT SURGERY Right 2015       Current Facility-Administered Medications:   •  ceFAZolin in dextrose (ANCEF) IVPB solution 2 g, 2 g, Intravenous, Once, Lisandra Rodriguez MD, 2 g at 23 0730  •  lactated ringers infusion, 100 mL/hr, Intravenous, Continuous, Lisandra Rodriguez MD, Last Rate: 1,000 mL/hr at 23 0729, 1,000 mL/hr at 23 07  •  Sod Citrate-Citric Acid (BICITRA) solution 30 mL, 30 mL, Oral, Once, Lisandra Rodriguez MD  •  sodium chloride 0.9 % flush 3 mL, 3 mL, Intravenous, Q12H, Lisandra Rodriguez MD  •  sodium chloride 0.9 % flush 3-10 mL, 3-10 mL, Intravenous, PRN, Lisandra Rodriguez MD  •  sodium chloride 0.9 % infusion 40 mL, 40  "mL, Intravenous, PRN, Michael, Lisandra Guadalupe MD    No Known Allergies    Social History    Tobacco Use      Smoking status: Never      Smokeless tobacco: Never    Review of Systems      Objective   /87 (BP Location: Left arm, Patient Position: Lying)   Pulse 118   Temp 98.6 °F (37 °C) (Oral)   Resp 16   Ht 160 cm (63\")   Wt 79.8 kg (176 lb)   LMP 2022 (Exact Date)   Breastfeeding Yes   BMI 31.18 kg/m²   General: well developed; well nourished  no acute distress   Heart: Not performed.   Lungs: breathing is unlabored   Abdomen: soft, non-tender; no masses  no umbilical or inguinal hernias are present  no hepato-splenomegaly     Prenatal Labs  Lab Results   Component Value Date    HGB 13.9 2023    RUBELLAABIGG <0.90 (L) 10/17/2022    HEPBSAG Negative 10/17/2022    ABSCRN Negative 2023    LET4ZUX3 Non Reactive 10/17/2022    HEPCVIRUSABY <0.1 10/17/2022     (H) 2023    GGTFASTING 74 2023    ZYB2IQPE 157 2023    IFL3CDIF 147 2023    HQY7UDHM 123 2023    STREPGPB No Group B Streptococcus isolated 2023    URINECX Final report 10/17/2022    CHLAMNAA Negative 10/17/2022    NGONORRHON Negative 10/17/2022       Recent Labs  Lab Results   Component Value Date    HGB 13.9 2023    HCT 39.2 2023    WBC 5.96 2023     2023           Assessment   1. IUP with an Estimated Date of Delivery: 23  2. Planned  section for previous C/S - not a  candidate   3. Planned vasectomy for contraception     Plan   1. Repeat   2. ABx and DVT prophylaxis    Lisandra Rodriguez MD  2023          "

## 2023-05-23 NOTE — ANESTHESIA PREPROCEDURE EVALUATION
Anesthesia Evaluation     Patient summary reviewed and Nursing notes reviewed   NPO Solid Status: > 6 hours  NPO Liquid Status: < 2 hours           Airway   Mallampati: II  TM distance: >3 FB  Neck ROM: full  Dental      Pulmonary - negative pulmonary ROS   Cardiovascular - negative cardio ROS        Neuro/Psych  (+) psychiatric history Anxiety and Depression,    GI/Hepatic/Renal/Endo - negative ROS     Musculoskeletal (-) negative ROS    Abdominal    Substance History - negative use     OB/GYN    (+) Pregnant,         Other - negative ROS                       Anesthesia Plan    ASA 2     ITN and spinal       Anesthetic plan, risks, benefits, and alternatives have been provided, discussed and informed consent has been obtained with: patient.    Use of blood products discussed with patient .   Plan discussed with attending.        CODE STATUS:

## 2023-05-23 NOTE — ANESTHESIA PROCEDURE NOTES
Spinal Block      Patient reassessed immediately prior to procedure    Patient location during procedure: OR  Indication:at surgeon's request  Performed By  CRNA/SANTA: Jennifer Moran CRNA  Preanesthetic Checklist  Completed: patient identified, IV checked, site marked, risks and benefits discussed, surgical consent, monitors and equipment checked, pre-op evaluation and timeout performed  Spinal Block Prep:  Patient Position:sitting  Sterile Tech:cap, gloves, mask and sterile barriers  Prep:Betadine  Patient Monitoring:blood pressure monitoring, continuous pulse oximetry and EKG    Spinal Block Procedure  Approach:midline  Guidance:palpation technique  Location:L3-L4  Needle Type:Robert  Needle Gauge:25 G  Placement of Spinal needle event:cerebrospinal fluid aspirated  Paresthesia: no  Fluid Appearance:clear     Post Assessment  Patient Tolerance:patient tolerated the procedure well with no apparent complications  Complications no

## 2023-05-24 LAB
BASOPHILS # BLD AUTO: 0.07 10*3/MM3 (ref 0–0.2)
BASOPHILS NFR BLD AUTO: 0.8 % (ref 0–1.5)
DEPRECATED RDW RBC AUTO: 44.6 FL (ref 37–54)
EOSINOPHIL # BLD AUTO: 0.11 10*3/MM3 (ref 0–0.4)
EOSINOPHIL NFR BLD AUTO: 1.3 % (ref 0.3–6.2)
ERYTHROCYTE [DISTWIDTH] IN BLOOD BY AUTOMATED COUNT: 13 % (ref 12.3–15.4)
HCT VFR BLD AUTO: 35.6 % (ref 34–46.6)
HGB BLD-MCNC: 12.1 G/DL (ref 12–15.9)
IMM GRANULOCYTES # BLD AUTO: 0.04 10*3/MM3 (ref 0–0.05)
IMM GRANULOCYTES NFR BLD AUTO: 0.5 % (ref 0–0.5)
LYMPHOCYTES # BLD AUTO: 2.06 10*3/MM3 (ref 0.7–3.1)
LYMPHOCYTES NFR BLD AUTO: 25 % (ref 19.6–45.3)
MCH RBC QN AUTO: 32.2 PG (ref 26.6–33)
MCHC RBC AUTO-ENTMCNC: 34 G/DL (ref 31.5–35.7)
MCV RBC AUTO: 94.7 FL (ref 79–97)
MONOCYTES # BLD AUTO: 0.52 10*3/MM3 (ref 0.1–0.9)
MONOCYTES NFR BLD AUTO: 6.3 % (ref 5–12)
NEUTROPHILS NFR BLD AUTO: 5.45 10*3/MM3 (ref 1.7–7)
NEUTROPHILS NFR BLD AUTO: 66.1 % (ref 42.7–76)
NRBC BLD AUTO-RTO: 0 /100 WBC (ref 0–0.2)
PLATELET # BLD AUTO: 144 10*3/MM3 (ref 140–450)
PMV BLD AUTO: 12 FL (ref 6–12)
RBC # BLD AUTO: 3.76 10*6/MM3 (ref 3.77–5.28)
WBC NRBC COR # BLD: 8.25 10*3/MM3 (ref 3.4–10.8)

## 2023-05-24 PROCEDURE — 25010000002 KETOROLAC TROMETHAMINE PER 15 MG: Performed by: OBSTETRICS & GYNECOLOGY

## 2023-05-24 PROCEDURE — 85025 COMPLETE CBC W/AUTO DIFF WBC: CPT | Performed by: OBSTETRICS & GYNECOLOGY

## 2023-05-24 PROCEDURE — 0503F POSTPARTUM CARE VISIT: CPT

## 2023-05-24 RX ADMIN — KETOROLAC TROMETHAMINE 15 MG: 15 INJECTION, SOLUTION INTRAMUSCULAR; INTRAVENOUS at 09:52

## 2023-05-24 RX ADMIN — OXYCODONE 5 MG: 5 TABLET ORAL at 07:41

## 2023-05-24 RX ADMIN — ACETAMINOPHEN 1000 MG: 500 TABLET ORAL at 00:00

## 2023-05-24 RX ADMIN — KETOROLAC TROMETHAMINE 15 MG: 15 INJECTION, SOLUTION INTRAMUSCULAR; INTRAVENOUS at 04:07

## 2023-05-24 RX ADMIN — IBUPROFEN 600 MG: 600 TABLET ORAL at 21:38

## 2023-05-24 RX ADMIN — OXYCODONE 5 MG: 5 TABLET ORAL at 15:30

## 2023-05-24 RX ADMIN — SIMETHICONE 80 MG: 80 TABLET, CHEWABLE ORAL at 20:34

## 2023-05-24 RX ADMIN — OXYCODONE 10 MG: 5 TABLET ORAL at 20:34

## 2023-05-24 RX ADMIN — DOCUSATE SODIUM 100 MG: 100 CAPSULE, LIQUID FILLED ORAL at 20:34

## 2023-05-24 RX ADMIN — ACETAMINOPHEN 650 MG: 325 TABLET ORAL at 12:07

## 2023-05-24 RX ADMIN — ACETAMINOPHEN 650 MG: 325 TABLET ORAL at 17:29

## 2023-05-24 RX ADMIN — PRENATAL VITAMINS-IRON FUMARATE 27 MG IRON-FOLIC ACID 0.8 MG TABLET 1 TABLET: at 20:34

## 2023-05-24 RX ADMIN — ACETAMINOPHEN 1000 MG: 500 TABLET ORAL at 06:33

## 2023-05-24 RX ADMIN — IBUPROFEN 600 MG: 600 TABLET ORAL at 15:30

## 2023-05-24 NOTE — PROGRESS NOTES
2023    Name:Reg Martinez    MR#:[id number]     PROGRESS NOTE:  Post-Op Day 1 S/P    HD:1    Subjective   37 y.o. yo Female  s/p CS at 39w1d doing well. Pain well controlled. Tolerating regular diet and having flatus. Lochia normal.     Patient Active Problem List   Diagnosis   • Previous  section   • Prenatal care, subsequent pregnancy in first trimester   • Antepartum multigravida of advanced maternal age   • HSIL on Pap smear of cervix   • Abnormality of placenta during pregnancy   • Fetal renal anomaly, single gestation   • Multigravida in third trimester   • Pregnancy   • Currently pregnant        Objective    Vitals  Temp:  Temp:  [97.1 °F (36.2 °C)-98 °F (36.7 °C)] 97.8 °F (36.6 °C)  Temp src: Oral  BP:  BP: (104-119)/(59-73) 113/68  Pulse:  Heart Rate:  [66-80] 69  RR:   Resp:  [16-18] 18    General Awake, alert, no distress  Abdomen Soft, non-distended, fundus firm, below umbilicus, appropriately tender  Incision  Intact, no erythema or exudate  Extremities Calves NT bilaterally     I/O last 3 completed shifts:  In: 1000 [I.V.:1000]  Out: 4149 [Urine:3725; Blood:424]    LABS:   Lab Results   Component Value Date    WBC 8.25 2023    HGB 12.1 2023    HCT 35.6 2023    MCV 94.7 2023     2023       Infant: male  . Desires circ.     Assessment   1.  POD 1, RCS. Doing well.    2.  Hemodynamically stable.       Plan:    1.  Doing well.  Routine postoperative care. Advance.       Active Problems:   None      Susan Nayak, JOVANI  2023 10:44 EDT

## 2023-05-24 NOTE — ANESTHESIA POSTPROCEDURE EVALUATION
Patient: Reg Martinez    Procedure Summary     Date: 23 Room / Location: [facility] LABOR DELIVERY   [facility] LABOR DELIVERY    Anesthesia Start: 741 Anesthesia Stop: 847    Procedure:  SECTION REPEAT (Abdomen) Diagnosis:     Surgeons: Lisandra Rodriguez MD Provider: Michael Dias MD    Anesthesia Type: ITN, spinal ASA Status: 2          Anesthesia Type: ITN, spinal    Vitals  Vitals Value Taken Time   /68 23 0700   Temp 97.8 °F (36.6 °C) 23 0700   Pulse 69 23 0700   Resp 18 23 0700   SpO2 97 % 23 1048   Vitals shown include unvalidated device data.        Post Anesthesia Care and Evaluation    Patient location during evaluation: bedside  Patient participation: complete - patient participated  Level of consciousness: awake and alert  Pain management: adequate    Airway patency: patent  Anesthetic complications: No anesthetic complications    Cardiovascular status: acceptable  Respiratory status: acceptable  Hydration status: acceptable  Post Neuraxial Block status: Motor and sensory function returned to baseline and No signs or symptoms of PDPH

## 2023-05-25 VITALS
HEART RATE: 84 BPM | BODY MASS INDEX: 31.18 KG/M2 | DIASTOLIC BLOOD PRESSURE: 69 MMHG | HEIGHT: 63 IN | OXYGEN SATURATION: 99 % | WEIGHT: 176 LBS | RESPIRATION RATE: 18 BRPM | TEMPERATURE: 98.2 F | SYSTOLIC BLOOD PRESSURE: 111 MMHG

## 2023-05-25 PROCEDURE — 90471 IMMUNIZATION ADMIN: CPT | Performed by: NURSE PRACTITIONER

## 2023-05-25 PROCEDURE — 90707 MMR VACCINE SC: CPT | Performed by: NURSE PRACTITIONER

## 2023-05-25 PROCEDURE — 25010000002 MEASLES, MUMPS & RUBELLA VAC RECONSTITUTED SOLUTION: Performed by: NURSE PRACTITIONER

## 2023-05-25 PROCEDURE — 0503F POSTPARTUM CARE VISIT: CPT | Performed by: NURSE PRACTITIONER

## 2023-05-25 RX ORDER — IBUPROFEN 600 MG/1
600 TABLET ORAL EVERY 6 HOURS
Qty: 60 TABLET | Refills: 0 | Status: SHIPPED | OUTPATIENT
Start: 2023-05-25

## 2023-05-25 RX ORDER — OXYCODONE HYDROCHLORIDE 5 MG/1
5 TABLET ORAL EVERY 4 HOURS PRN
Qty: 12 TABLET | Refills: 0 | Status: SHIPPED | OUTPATIENT
Start: 2023-05-25 | End: 2023-05-28

## 2023-05-25 RX ADMIN — ACETAMINOPHEN 650 MG: 325 TABLET ORAL at 12:59

## 2023-05-25 RX ADMIN — IBUPROFEN 600 MG: 600 TABLET ORAL at 04:14

## 2023-05-25 RX ADMIN — DOCUSATE SODIUM 100 MG: 100 CAPSULE, LIQUID FILLED ORAL at 10:42

## 2023-05-25 RX ADMIN — OXYCODONE 5 MG: 5 TABLET ORAL at 10:42

## 2023-05-25 RX ADMIN — ACETAMINOPHEN 650 MG: 325 TABLET ORAL at 06:37

## 2023-05-25 RX ADMIN — MEASLES, MUMPS, AND RUBELLA VIRUS VACCINE LIVE 0.5 ML: 1000; 12500; 1000 INJECTION, POWDER, LYOPHILIZED, FOR SUSPENSION SUBCUTANEOUS at 14:02

## 2023-05-25 RX ADMIN — IBUPROFEN 600 MG: 600 TABLET ORAL at 10:42

## 2023-05-25 RX ADMIN — ACETAMINOPHEN 650 MG: 325 TABLET ORAL at 00:08

## 2023-05-25 NOTE — LACTATION NOTE
05/25/23 1135   Maternal Information   Date of Referral 05/25/23   Person Making Referral lactation consultant   Infant Reason for Referral other (see comments)  (weight loss is at 8.6% and feeding times were not good overnight)   Maternal Infant Feeding   Maternal Emotional State relaxed;receptive   Latch Assistance none needed  (Mom said baby will only nurse one side and falls asleep.  Wake-up techniques were discussed.)   Breast Pumping   Breast Pumping Interventions post-feed pumping encouraged     Mom said baby is only nursing on one side and falls asleep.  She is currently pumping on the opposite side while nursing and getting drops.  Wake-up techniques were discussed. Since baby's weight loss is at 8.6% and he was circumcised this morning, it was recommended that she pump both sides after breastfeeding to encourage more milk volume and to be able to feed baby extra EBM. She was also encouraged to call for latch assistance, if needed.  She said she thinks her milk is coming in because her breasts feel de leon.

## 2023-05-25 NOTE — PLAN OF CARE
Goal Outcome Evaluation:   Vitals stable, tolerating PO, voiding w/out difficulty, light lochia, no s/s of infection.

## 2023-05-25 NOTE — DISCHARGE SUMMARY
Discharge Summary    Date of Admission: 2023  Date of Discharge:  2023      Patient: Reg Martinez      MR#:[id number]    Primary Surgeon/OB: Lisandra Rodriguez MD      Presenting Problem/History of Present Illness  Currently pregnant [Z34.90]  Currently pregnant [Z34.90]     Patient Active Problem List   Diagnosis   • Previous  section   • Prenatal care, subsequent pregnancy in first trimester   • Antepartum multigravida of advanced maternal age   • HSIL on Pap smear of cervix   • Abnormality of placenta during pregnancy   • Fetal renal anomaly, single gestation   • Multigravida in third trimester   • Pregnancy   • Currently pregnant         Discharge Diagnosis:  section at 39w1d    Procedures:  , Low Transverse     2023    8:07 AM      Feeding method: Breastfeeding Status: Yes    Rh Immune globulin given: not applicable    Rubella vaccine given: not applicable    Discharge Date: 2023; Discharge Time: 09:07 EDT    Early Discharge:  NO    Hospital Course  Patient is a 37 y.o. female  at 39w1d status post  section with uneventful postoperative recovery.  Patient was advanced to regular diet on postoperative day#1.  On discharge, ambulating, tolerating a regular diet without any difficulties and her incision is dry, clean and intact.     Infant:   male  fetus 3716 g (8 lb 3.1 oz)  with Apgar scores of 8 , 9  at five minutes.    Circumcision: yes, desires before discharge    Condition on Discharge:  Stable    Vital Signs  Temp:  [97.8 °F (36.6 °C)-98.5 °F (36.9 °C)] 98.2 °F (36.8 °C)  Heart Rate:  [63-84] 84  Resp:  [16-18] 18  BP: (110-132)/(62-73) 111/69    Lab Results   Component Value Date    WBC 8.25 2023    HGB 12.1 2023    HCT 35.6 2023    MCV 94.7 2023     2023       Discharge Disposition  Home or Self Care    Discharge Medications     Discharge Medications      New Medications      Instructions Start  Date   ibuprofen 600 MG tablet  Commonly known as: ADVIL,MOTRIN   600 mg, Oral, Every 6 Hours      oxyCODONE 5 MG immediate release tablet  Commonly known as: ROXICODONE   5 mg, Oral, Every 4 Hours PRN         Continue These Medications      Instructions Start Date   prenatal (CLASSIC) vitamin  tablet  Generic drug: prenatal vitamin   1 tablet, Oral, Daily         Stop These Medications    acetaminophen 500 MG tablet  Commonly known as: TYLENOL     cetirizine 10 MG tablet  Commonly known as: zyrTEC            Discharge Diet:     Activity at Discharge:   Activity Instructions     Driving Restrictions      Type of Restriction: Driving    Driving Restrictions: No Driving (Time Limited)    Length: 2 Weeks    Lifting Restrictions      Type of Restriction: Lifting    Lifting Restrictions: Lifting Restriction (Indicate Limit)    Weight Limit (Pounds): -5    Length of Lifting Restriction: 2-3 weeks    Sexual Activity Restrictions      Type of Restriction: Sex    Explain Sexual Activity Restrictions: Pelvic rest for 6 weeks including tampons, intercourse, and swimming    Work Restrictions      Type of Restriction: Work    May Return to Work: Other    Return to Work Instructions: May return to work after 6 weeks          Follow-up Appointments  No future appointments.  Additional Instructions for the Follow-ups that You Need to Schedule     Call MD With Problems / Concerns   As directed      Instructions: Call for temp >/= 100.4, severe pain, severe bleeding, headache that does not improve with medication, blurred vision, or postpartum depression. Monitor incision for signs of infection including, foul odor or discharge.  Call for blood clots larger than a golf ball or saturating a pad in less than an hour.    Order Comments: Instructions: Call for temp >/= 100.4, severe pain, severe bleeding, headache that does not improve with medication, blurred vision, or postpartum depression. Monitor incision for signs of infection  including, foul odor or discharge.  Call for blood clots larger than a golf ball or saturating a pad in less than an hour.          Discharge Follow-up with Specified Provider: FU in 2 weeks with Dr. Rodriguez or an JOVANI; 2 Weeks   As directed      To: FU in 2 weeks with Dr. Rodriguez or an JOVANI    Follow Up: 2 Weeks               JOVANI Neal  05/25/23  09:06 EDT

## 2023-05-26 PROBLEM — O09.523 MULTIGRAVIDA OF ADVANCED MATERNAL AGE IN THIRD TRIMESTER: Status: ACTIVE | Noted: 2023-05-26

## 2023-06-07 ENCOUNTER — POSTPARTUM VISIT (OUTPATIENT)
Dept: OBSTETRICS AND GYNECOLOGY | Facility: CLINIC | Age: 38
End: 2023-06-07
Payer: COMMERCIAL

## 2023-06-07 VITALS
WEIGHT: 156.8 LBS | DIASTOLIC BLOOD PRESSURE: 82 MMHG | BODY MASS INDEX: 27.78 KG/M2 | SYSTOLIC BLOOD PRESSURE: 110 MMHG | HEIGHT: 63 IN

## 2023-06-07 PROCEDURE — 0503F POSTPARTUM CARE VISIT: CPT

## 2023-06-07 NOTE — PROGRESS NOTES
"      Chief Complaint   Patient presents with    Postpartum Care     2 week       2 Week Postpartum Visit         Reg Martinez is a 37 y.o.  who presents today for a 2 week postpartum check.        , Low Transverse    Information for the patient's :  Ryan Martinez [[id number]]   2023   male   Ryan Martinez   3716 g (8 lb 3.1 oz)   Gestational Age: 39w1d    Baby Discharged with Mom  Delivering MD: Lisandra Rodriguez MD.    Pregnancy complications: no known issues    Patient reports her incision is clean, dry, intact. Patient describes vaginal bleeding as light.  She is breast feeding. Patient denies bowel or bladder issues.    She desires contraceptive methods: Abstinence for contraception.    Patient denies postpartum depression. Postpartum Depression Screening Questionnaire: 4, no treatment indicated.      The additional following portions of the patient's history were reviewed and updated as appropriate: allergies, current medications, past family history, past medical history, past social history, past surgical history, and problem list.      Review of Systems   Respiratory: Negative.  Negative for shortness of breath.    Cardiovascular: Negative.  Negative for chest pain.   Gastrointestinal: Negative.  Negative for abdominal distention, abdominal pain and constipation.        Nml post op tenderness.   Genitourinary:  Positive for vaginal bleeding. Negative for breast lump, breast pain, decreased libido, dysuria, pelvic pain, pelvic pressure, vaginal discharge and vaginal pain.   Psychiatric/Behavioral: Negative.  Negative for depressed mood. The patient is not nervous/anxious.    All other systems reviewed and are negative.    I have reviewed and agree with the HPI, ROS, and historical information as entered above. Susan Nayak, APRN      Objective   /82   Ht 160 cm (62.99\")   Wt 71.1 kg (156 lb 12.8 oz)   LMP 2022 (Exact " Date)   Breastfeeding Yes   BMI 27.78 kg/m²     Physical Exam  Vitals and nursing note reviewed.   Constitutional:       General: She is not in acute distress.     Appearance: Normal appearance. She is not ill-appearing or toxic-appearing.   HENT:      Head: Normocephalic and atraumatic.   Pulmonary:      Effort: Pulmonary effort is normal.   Abdominal:      General: There is no distension.      Palpations: There is no mass.      Tenderness: There is abdominal tenderness (Nml post op tenderness).      Hernia: No hernia is present.      Comments: LTCS incision well approximated; no drainage, bleeding, or redness.      Neurological:      Mental Status: She is alert and oriented to person, place, and time.   Psychiatric:         Mood and Affect: Mood normal.         Behavior: Behavior normal.         Thought Content: Thought content normal.         Judgment: Judgment normal.          Assessment and Plan    Problem List Items Addressed This Visit    None  Visit Diagnoses       Postpartum follow-up    -  Primary    s/p c/s delivery            S/p repeat , 2 weeks postpartum.  Doing well.    Baby doing well.  Breastfeeding going well.  No si/sx of postpartum depression  Last Pap 2023. Neg, Neg. Transformation zone is absent or insufficient. Annual repeat Pap smear is suggested.   Contraception: contraceptive methods: Abstinence. We discussed contraception after restrictions are lifted. Patient is considering a Mirena for contraception. Mirena information sent to patient via [employer].     Plan:  Continued pelvic rest with a return to driving and light physical activity.  Considering Mirena for contraception.  Next pap 2024  Return in about 4 weeks (around 2023) for 6 week PP visit, Michael.    Susan Nayak, JOVANI  2023

## 2023-08-08 ENCOUNTER — OFFICE VISIT (OUTPATIENT)
Dept: OBSTETRICS AND GYNECOLOGY | Facility: CLINIC | Age: 38
End: 2023-08-08
Payer: COMMERCIAL

## 2023-08-08 VITALS
WEIGHT: 156 LBS | DIASTOLIC BLOOD PRESSURE: 72 MMHG | BODY MASS INDEX: 28.71 KG/M2 | SYSTOLIC BLOOD PRESSURE: 110 MMHG | HEIGHT: 62 IN

## 2023-08-08 DIAGNOSIS — Z30.430 ENCOUNTER FOR IUD INSERTION: Primary | ICD-10-CM

## 2023-08-08 LAB
B-HCG UR QL: NEGATIVE
EXPIRATION DATE: NORMAL
INTERNAL NEGATIVE CONTROL: NEGATIVE
INTERNAL POSITIVE CONTROL: POSITIVE
Lab: NORMAL

## 2023-08-08 NOTE — PROGRESS NOTES
Gynecologic Procedure Note        Procedure: IUD Insertion     Procedures    Pre procedure indication 1) Desires Mirena  Post procedure indication 1) Desires Mirena    NDC: Mirena 68478-846-78  Lot #: [id number]  Exp Date: 092025  BH device    The risks, benefits, and alternatives to Mirena were explained at length with the patient. All her questions were answered and consents were signed.  Her LMP was NONE, NURSING .  Urine Pregnancy Test was Negative.  Patient does not have an allergy to betadine or shellfish.    The patient was placed in a dorsal lithotomy position on the examining table in [facility]. A bimanual exam confirmed the uterus was anteverted. A speculum was inserted into the vagina and the cervix was brought into view.  The cervix was prepped with Betadine. The anterior lip of the cervix was then grasped with a single-tooth tenaculum. The endometrial cavity was then sounded to 9 centimeters. The sealed Mirena package was opened and the IUD was removed in a sterile fashion.    The upper edge of the depth setting the flange was set at the uterine sound measurement. The  was then carefully advanced to the cervical canal into the uterus to the level of the fundus.  The slider was then retracted about 1 cm and deployed the device. The device was then gently advanced to the fundus. The IUD was then released by pulling the slider down all the way. The  was removed carefully from the uterus. The threads were then cut leaving 2-3 cm visible outside of the cervix.  The single-tooth tenaculum was removed from the anterior lip. Good hemostasis was noted.   All other instruments were removed from the vagina.       The patient tolerated the procedure well with a mild amount of discomfort.  She was monitored for 10 minutes prior to discharge.      There were no complications.    The patient was counseled about the need to return in 4 weeks for IUD check.     She was counseled about the need to use a  backup method of contraception such as condoms until her post insertion exam was performed. The patient verbalized understanding when the IUD will need to be removed/replaced. Written information was given to the patient.  The patient is counseled to contact us if she has any significant or increasing bleeding, pain, fever, chills, or other concerns. She is instructed to see a doctor right away if she believes that she may be pregnant at any time with the IUD in place.    Lisandra Rodriguez MD  08/08/2023

## 2023-09-05 ENCOUNTER — OFFICE VISIT (OUTPATIENT)
Dept: OBSTETRICS AND GYNECOLOGY | Facility: CLINIC | Age: 38
End: 2023-09-05
Payer: COMMERCIAL

## 2023-09-05 VITALS — SYSTOLIC BLOOD PRESSURE: 126 MMHG | WEIGHT: 158.8 LBS | BODY MASS INDEX: 29.04 KG/M2 | DIASTOLIC BLOOD PRESSURE: 72 MMHG

## 2023-09-05 DIAGNOSIS — Z30.431 SURVEILLANCE OF PREVIOUSLY PRESCRIBED INTRAUTERINE CONTRACEPTIVE DEVICE: Primary | ICD-10-CM

## 2023-09-05 PROBLEM — O09.529 ANTEPARTUM MULTIGRAVIDA OF ADVANCED MATERNAL AGE: Status: RESOLVED | Noted: 2023-01-05 | Resolved: 2023-09-05

## 2023-09-05 PROBLEM — Z34.90 PREGNANCY: Status: RESOLVED | Noted: 2023-05-14 | Resolved: 2023-09-05

## 2023-09-05 PROBLEM — O35.EXX0 FETAL RENAL ANOMALY, SINGLE GESTATION: Status: RESOLVED | Noted: 2023-04-03 | Resolved: 2023-09-05

## 2023-09-05 PROBLEM — Z34.83 MULTIGRAVIDA IN THIRD TRIMESTER: Status: RESOLVED | Noted: 2023-04-18 | Resolved: 2023-09-05

## 2023-09-05 PROBLEM — Z34.81 PRENATAL CARE, SUBSEQUENT PREGNANCY IN FIRST TRIMESTER: Status: RESOLVED | Noted: 2022-10-17 | Resolved: 2023-09-05

## 2023-09-05 PROBLEM — Z34.90 CURRENTLY PREGNANT: Status: RESOLVED | Noted: 2023-05-22 | Resolved: 2023-09-05

## 2023-09-05 PROBLEM — O09.523 MULTIGRAVIDA OF ADVANCED MATERNAL AGE IN THIRD TRIMESTER: Status: RESOLVED | Noted: 2023-05-26 | Resolved: 2023-09-05

## 2023-09-05 PROBLEM — O43.109 ABNORMALITY OF PLACENTA DURING PREGNANCY: Status: RESOLVED | Noted: 2023-02-14 | Resolved: 2023-09-05

## 2023-09-05 NOTE — PROGRESS NOTES
Chief Complaint   Patient presents with    Follow-up     IUD check         Subjective   HPI  Reg Martinez is a 38 y.o. female, , who presents for IUD check follow up.  She had a Mirena placed on 23. Since the IUD placement, the patient has not had any unusual complaints. She has had a period since the IUD was placed.    The additional following portions of the patient's history were reviewed and updated as appropriate: allergies and current medications.    Did the patient have u/s today? Yes.  Findings showed IUD had correct placement.  I have personally evaluated the U/S and agree with the findings. JOVANI Rockwell    Review of Systems   Constitutional: Negative.    Respiratory: Negative.     Cardiovascular: Negative.    Gastrointestinal: Negative.    Genitourinary: Negative.    Psychiatric/Behavioral: Negative.     All other systems reviewed and are negative.     I have reviewed and agree with the HPI, ROS, and historical information as entered above. JOVANI Rockwell      Objective   /72   Wt 72 kg (158 lb 12.8 oz)   LMP 2023 (Approximate)   Breastfeeding Yes   BMI 29.04 kg/m²     Physical Exam  Vitals and nursing note reviewed.   Constitutional:       Appearance: Normal appearance. She is normal weight.   Musculoskeletal:         General: Normal range of motion.   Neurological:      Mental Status: She is alert and oriented to person, place, and time.     Assessment & Plan     Assessment     Problem List Items Addressed This Visit    None  Visit Diagnoses       Surveillance of previously prescribed intrauterine contraceptive device    -  Primary            IUD placed correctly    Plan     IUD in correct placement. Call with questions or concerns.   Return in about 6 months (around 3/5/2024) for Annual physical.      JOVANI Rockwell  2023

## 2024-05-14 ENCOUNTER — TELEPHONE (OUTPATIENT)
Dept: OBSTETRICS AND GYNECOLOGY | Facility: CLINIC | Age: 39
End: 2024-05-14

## 2024-05-14 NOTE — TELEPHONE ENCOUNTER
Provider: DR CONNELL    Caller: MANNIE CHAVEZ    Phone Number: [phone]    Reason for Call: SAME DAY CANCELLATION//CHILD SICK//RESCHEDULED

## (undated) DEVICE — 4-PORT MANIFOLD: Brand: NEPTUNE 2

## (undated) DEVICE — PK C/SECT 10

## (undated) DEVICE — SOL IRR H2O BTL 1000ML STRL

## (undated) DEVICE — MAT PREVALON MOBL TRANSFR AIR WO/PAD 39X80IN

## (undated) DEVICE — GLV SURG BIOGEL LTX PF 6 1/2

## (undated) DEVICE — SUT VIC 2/0 CT1 27IN J339H BX/36

## (undated) DEVICE — PATIENT RETURN ELECTRODE, SINGLE-USE, CONTACT QUALITY MONITORING, ADULT, WITH 9FT CORD, FOR PATIENTS WEIGING OVER 33LBS. (15KG): Brand: MEGADYNE

## (undated) DEVICE — SOL IRR NACL 0.9PCT BT 1000ML

## (undated) DEVICE — SUT VIC 2/0 CT1 27IN J259H

## (undated) DEVICE — ADHS SKIN PREMIERPRO EXOFIN TOPICAL HI/VISC .5ML

## (undated) DEVICE — ANTIBACTERIAL UNDYED BRAIDED (POLYGLACTIN 910), SYNTHETIC ABSORBABLE SUTURE: Brand: COATED VICRYL

## (undated) DEVICE — TBG PENCL TELESCP MEGADYNE SMOKE EVAC 10FT

## (undated) DEVICE — APPL CHLORAPREP W/TINT 26ML BLU

## (undated) DEVICE — TRAP FLD MINIVAC MEGADYNE 100ML

## (undated) DEVICE — NDL HYPO ECLPS SFTY 18G 1 1/2IN

## (undated) DEVICE — VIOLET BRAIDED (POLYGLACTIN 910), SYNTHETIC ABSORBABLE SUTURE: Brand: COATED VICRYL

## (undated) DEVICE — TRY SPINE BLCK WHITACRE 25G 3X5IN